# Patient Record
Sex: MALE | Race: WHITE | NOT HISPANIC OR LATINO | Employment: FULL TIME | URBAN - METROPOLITAN AREA
[De-identification: names, ages, dates, MRNs, and addresses within clinical notes are randomized per-mention and may not be internally consistent; named-entity substitution may affect disease eponyms.]

---

## 2017-01-10 ENCOUNTER — PATIENT MESSAGE (OUTPATIENT)
Dept: CARDIOLOGY | Facility: CLINIC | Age: 62
End: 2017-01-10

## 2017-01-12 ENCOUNTER — TELEPHONE (OUTPATIENT)
Dept: CARDIOLOGY | Facility: CLINIC | Age: 62
End: 2017-01-12

## 2017-01-12 NOTE — TELEPHONE ENCOUNTER
----- Message from Kalie Allison sent at 1/12/2017  9:51 AM CST -----  Contact: [phone]  Pt is calling to speak with the nurse regarding a form that was sent over twice by the pharmacy to be filled out for the pt rx of Crestor/please aidan to advise/pt has been out of this medication for a week /please call to advise/thanks..    [address] [address]  Phone: [phone] Fax: [phone]

## 2017-01-12 NOTE — TELEPHONE ENCOUNTER
Spoke with patient, advised PA was initiated for Crestor and determination would be made in 1-5 business days. Patient verbalized understanding.

## 2017-01-17 RX ORDER — ATORVASTATIN CALCIUM 80 MG/1
80 TABLET, FILM COATED ORAL DAILY
COMMUNITY
End: 2023-05-02 | Stop reason: SDUPTHER

## 2017-01-17 NOTE — TELEPHONE ENCOUNTER
PA was denied for Crestor. Notified Dr. Dueñas. He gave VORB for atorvastatin 80mg Q day. Called in script to patient's pharmacy.    Called and notified patient of medication change and doctor's recommendation. Patient verbalized understanding.     Patient med list reconciled.

## 2017-02-26 ENCOUNTER — PATIENT MESSAGE (OUTPATIENT)
Dept: FAMILY MEDICINE | Facility: CLINIC | Age: 62
End: 2017-02-26

## 2017-02-27 DIAGNOSIS — N40.0 BENIGN NON-NODULAR PROSTATIC HYPERPLASIA WITHOUT LOWER URINARY TRACT SYMPTOMS: ICD-10-CM

## 2017-02-27 RX ORDER — TAMSULOSIN HYDROCHLORIDE 0.4 MG/1
0.4 CAPSULE ORAL DAILY
Qty: 90 CAPSULE | Refills: 3 | Status: SHIPPED | OUTPATIENT
Start: 2017-02-27 | End: 2021-04-15

## 2017-05-15 RX ORDER — LISINOPRIL 20 MG/1
20 TABLET ORAL DAILY
Qty: 90 TABLET | Refills: 0 | Status: SHIPPED | OUTPATIENT
Start: 2017-05-15 | End: 2021-04-15

## 2017-05-15 RX ORDER — LISINOPRIL 20 MG/1
TABLET ORAL
Qty: 90 TABLET | Refills: 0 | Status: SHIPPED | OUTPATIENT
Start: 2017-05-15 | End: 2017-05-15 | Stop reason: SDUPTHER

## 2017-09-29 DIAGNOSIS — Z12.11 COLON CANCER SCREENING: ICD-10-CM

## 2019-04-25 ENCOUNTER — OFFICE VISIT (OUTPATIENT)
Dept: CARDIOLOGY | Facility: CLINIC | Age: 64
End: 2019-04-25
Payer: COMMERCIAL

## 2019-04-25 VITALS
SYSTOLIC BLOOD PRESSURE: 154 MMHG | BODY MASS INDEX: 24.24 KG/M2 | WEIGHT: 169.31 LBS | DIASTOLIC BLOOD PRESSURE: 80 MMHG | HEART RATE: 58 BPM | OXYGEN SATURATION: 94 % | HEIGHT: 70 IN

## 2019-04-25 DIAGNOSIS — E78.5 DYSLIPIDEMIA: Chronic | ICD-10-CM

## 2019-04-25 DIAGNOSIS — I10 ESSENTIAL HYPERTENSION: Chronic | ICD-10-CM

## 2019-04-25 DIAGNOSIS — Z79.1 NSAID LONG-TERM USE: ICD-10-CM

## 2019-04-25 DIAGNOSIS — I25.10 CORONARY ARTERY DISEASE INVOLVING NATIVE CORONARY ARTERY OF NATIVE HEART WITHOUT ANGINA PECTORIS: Primary | ICD-10-CM

## 2019-04-25 DIAGNOSIS — I10 HYPERTENSION, UNSPECIFIED TYPE: ICD-10-CM

## 2019-04-25 DIAGNOSIS — I10 HYPERTENSION, UNSPECIFIED TYPE: Primary | ICD-10-CM

## 2019-04-25 PROCEDURE — 99215 PR OFFICE/OUTPT VISIT, EST, LEVL V, 40-54 MIN: ICD-10-PCS | Mod: S$GLB,,, | Performed by: INTERNAL MEDICINE

## 2019-04-25 PROCEDURE — 3077F PR MOST RECENT SYSTOLIC BLOOD PRESSURE >= 140 MM HG: ICD-10-PCS | Mod: CPTII,S$GLB,, | Performed by: INTERNAL MEDICINE

## 2019-04-25 PROCEDURE — 93000 ELECTROCARDIOGRAM COMPLETE: CPT | Mod: S$GLB,,, | Performed by: INTERNAL MEDICINE

## 2019-04-25 PROCEDURE — 99999 PR PBB SHADOW E&M-EST. PATIENT-LVL III: ICD-10-PCS | Mod: PBBFAC,,, | Performed by: INTERNAL MEDICINE

## 2019-04-25 PROCEDURE — 3008F PR BODY MASS INDEX (BMI) DOCUMENTED: ICD-10-PCS | Mod: CPTII,S$GLB,, | Performed by: INTERNAL MEDICINE

## 2019-04-25 PROCEDURE — 99999 PR PBB SHADOW E&M-EST. PATIENT-LVL III: CPT | Mod: PBBFAC,,, | Performed by: INTERNAL MEDICINE

## 2019-04-25 PROCEDURE — 93000 EKG 12-LEAD: ICD-10-PCS | Mod: S$GLB,,, | Performed by: INTERNAL MEDICINE

## 2019-04-25 PROCEDURE — 99215 OFFICE O/P EST HI 40 MIN: CPT | Mod: S$GLB,,, | Performed by: INTERNAL MEDICINE

## 2019-04-25 PROCEDURE — 3079F DIAST BP 80-89 MM HG: CPT | Mod: CPTII,S$GLB,, | Performed by: INTERNAL MEDICINE

## 2019-04-25 PROCEDURE — 3077F SYST BP >= 140 MM HG: CPT | Mod: CPTII,S$GLB,, | Performed by: INTERNAL MEDICINE

## 2019-04-25 PROCEDURE — 3079F PR MOST RECENT DIASTOLIC BLOOD PRESSURE 80-89 MM HG: ICD-10-PCS | Mod: CPTII,S$GLB,, | Performed by: INTERNAL MEDICINE

## 2019-04-25 PROCEDURE — 3008F BODY MASS INDEX DOCD: CPT | Mod: CPTII,S$GLB,, | Performed by: INTERNAL MEDICINE

## 2019-04-25 RX ORDER — MELOXICAM 7.5 MG/1
7.5 TABLET ORAL
COMMUNITY
End: 2023-03-22

## 2019-04-25 RX ORDER — RAMIPRIL 5 MG/1
5 CAPSULE ORAL DAILY
COMMUNITY
End: 2021-04-15 | Stop reason: DRUGHIGH

## 2019-04-25 NOTE — PATIENT INSTRUCTIONS
Recommended Mediterranean dietEating Heart-Healthy Food: Using the DASH Plan  Eating for your heart doesnt have to be hard or boring. You just need to know how to make healthier choices. The DASH eating plan has been developed to help you do just that. DASH stands for Dietary Approaches to Stop Hypertension. It is a plan that has been proven to be healthier for your heart and to lower your risk for high blood pressure. It can also help lower your risk for cancer, heart disease, osteoporosis, and diabetes.  Choosing from Each Food Group  Choose foods from each of the food groups below each day. Try to get the recommended number of servings for each food group. The serving numbers are based on a diet of 2,000 calories a day. Talk to your doctor if youre unsure about your calorie needs.  Grains   Servings: 7-8 a day  A serving is:  · 1 slice bread  · 1 ounce dry cereal  · half a cup cooked rice or pasta  Best choices: Whole grains and any grains high in fiber.  Vegetables   Servings: 4-5 a day  A serving is:  · 1 cup raw leafy vegetable  · Half a cup cooked vegetable  · Three-quarter cup vegetable juice  Best choices: Fresh or frozen vegetable prepared without too much added salt or fat.    Fruits   Servings: 4-5 a day  A serving is:  · Three-quarter cup fruit juice  · 1 medium fruit  · One-quarter cup dried fruit  · One-half cup fresh, frozen, or canned fruit  Best choices: A variety of fresh fruits of different colors. Whole fruits are a much better choice than fruit juices.  Low-fat or Fat Free Dairy   Servings: 2-3 a day  A serving is:  · 8 ounces milk  · 1 cup yogurt  · One and a half ounces cheese  Best choices: Skim or 1% milk, low-fat or fat free yogurt or buttermilk, and low-fat cheeses.       Meat, Poultry, Fish   Servings: 2 or fewer a day  A serving is:  · 3 ounces cooked meat, poultry, or fish  Best choices: Lean meats and fish. Trim away visible fat. Broil, roast, or boil instead of frying. Remove skin  from poultry before eating.  Nuts, Seeds, Beans   Servings: 4-5 a week  A serving is:  · One third cup nuts (or one and a half ounces)  · 2 tablespoons sunflower seeds  · Half a cup cooked beans  Best choices: Dry roasted nuts with no salt added, lentils, kidney beans, garbanzo beans, and whole hoff beans.    Fats and Oils   Servings: 2 a day  A serving is:  · 1 teaspoon vegetable oil  · 1 teaspoon soft margarine  · 1 tablespoon low-fat mayonnaise  · 1 teaspoon regular mayonnaise  · 2 tablespoons light salad dressing  · 1 tablespoon regular salad dressing  Best choices: Monounsaturated and polyunsaturated fats such as olive, canola, or safflower oil.  Sweets   Servings: 5 a week or fewer  A serving is:  · 1 tablespoon sugar, maple syrup, or honey  · 1 tablespoon jam or jelly  · 1 half-ounce jelly beans (about 15)  · 8 ounces lemonade  Best choices: Dried fruit can be a satisfying sweet. Choose low-fat sweets when possible. And watch your serving sizes!       Aerobic Exercise for a Healthy Heart  Exercise is a lot more than an energy booster and a stress reliever. It also strengthens your heart muscle, lowers your blood pressure and blood cholesterol, and burns calories.      Remember, some activity is better than none.     Choose an Aerobic Activity  Choose a nonstop activity that makes your heart and lungs work harder than they do when you rest or walk normally. This aerobic exercise can improve the way your heart and other muscles use oxygen. Make it fun by exercising with a friend and choosing an activity you enjoy. Here are some ideas:  · Walking  · Swimming  · Bicycling  · Stair climbing  · Dancing  · Jogging  Exercise Regularly  If you havent been exercising regularly,  get your doctors okay first. Then start slowly.  Here are some tips:  · Begin exercising 3 times a week for 5-10 minutes at a time.  · When you feel comfortable, add a few minutes each week.  · Slowly build up to exercising 3-4 times each  week for 20-40 minutes. Aim for a total of 150 or more minutes a week.  · Be sure to carry your nitroglycerin with you when you exercise.  · If you get angina when youre exercising, stop what youre doing, take your nitroglycerin, and call your doctor.  © 1657-5286 [employer], [address] [place] [address], [place] [address]. All rights reserved. This information is not intended as a substitute for professional medical care. Always follow your healthcare professional's instructions.

## 2019-04-25 NOTE — PROGRESS NOTES
"Subjective:    Patient ID:  Basim Menendez is a 64 y.o. male who presents for evaluation of Annual Exam  For complex PCI of RCA,  to 2/3/2016, cardiac follow up at urging of girlfriend  PCP: Dr. Wang, [place], started in 2017  Eye: Maria Victoria Cruz  Prior Cardiologist: Dr. 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    Health literacy: medium  Activities: to gym or jog, 5 days weekly for an hour, no problem  Nicotine: quit , 20 py  Alcohol: socially  Illicit drugs: none  Cardiac symptoms: none  Home BP:  to 150, off lisinopril 20 mg for over a year and just started Ramipril 5 mg daily for the past month, have not seen much change  Medication compliance: yes  Diet: regular  Caffeine: 4 cpd  Labs: 2016, LDL 58, on Rx, recent lab result not available, told of good results  Last Echo: 2016  Last stress test:  in [place]  Cardiovascular angiogram: 2016 with PCI  ECG: NSR, rate 56  Fundoscopic exam: 2+ years ago      In 2016:  [facility] - "Attending Physician: Celestina Craig MD     Reason for Admission: CAD transfer for PCI     Procedures Performed: Procedure(s) (LRB):  HEART CATH-LEFT (Left)  [id number]-CORONARY (PTCA) with rotablator (Left)    Hospital Course HPI: Basim Menendez is a 60 y.o. male with ho HTN, dyslipidemia and fam hx of heart disease (father  of MI at 56, he also had type 1 DM). Pt transferred from [facility] for PCI if PDA which was unsuccessful at OSH, he also had successful YOVANY placement to the distal RCA (CD is on chart and was reviewed by interventional cardiology- Dr Boland). No other records are available except for cath CD. Pt says cath was elective and done because of an abnormal stress test. He has been having exertional chest pain for the past year. He describes tightness that sometimes only lasts seconds but can " "last several minutes, it sometimes resolves with continued exercise other times resolves with rest. He denies associated dyspnea, nausea, vomiting, diaphoresis or palpitations.    Course: Patient admitted to the Cardiology Team B Service and placed on 24 hour telemetry  //CAD- on admit continued Aggrastat, heparin and Brilinta. Coreg and Crestor were started. Patient remained chest pain free throughout hospital stay. 2D echo done and EF preserved: 55-60%. S/p PCI of RCA/PDA with rotablator and YOVANY. Will discharge today with follow up with Dr. Dueñas with [facility] in [place] at patients request and Cardiac Rehab. Patient educated on lifestyle modifications and importance of compliance with medications.    //HTN--Coreg started on admit and -160's. Will add low dose diuretic on discharge.  //DLD -takes crestor at home. Unsure of dose (pt does not have list or pill bottles with him). Would continue current dose as lipids are controlled  -total chol 123 and LDL 58"    ECHO 2/2016 CONCLUSIONS     1 - Normal left ventricular systolic function (EF 55-60%).     2 - Concentric remodeling. LV wall thickness is normal, with the septum measuring 1.2 cm and the posterior wall measuring 1.1 cm across.    3 - Normal left ventricular diastolic function.     4 - Normal right ventricular systolic function .     5 - Trivial mitral regurgitation.     6 - Intermediate central venous pressure.     PCI, 2/2/2016  HEMODYNAMIC RESULTS:    BP: 102/60    C. ANGIOGRAPHIC RESULTS:    DIAGNOSTIC:       Patient has a right dominant coronary artery.        - Right Coronary Artery:             The RCA is diffusely diseased. There is XOCHILT 3 flow.               The proximal RCA has a 75% stenosis. There is XOCHILT 3 flow.       - Posterior Descending Artery:             The proximal PDA has a 90% stenosis. There is XOCHILT 3 flow.               The proximal PDA has a 90% stenosis. There is XOCHILT 3 flow.               The distal PDA has luminal " irregularities. There is XOCHILT 3 flow.       - Right Ventricle:             The right ventricle was not studied.       - Common Femoral Artery:             The left CFA has plaque. There is XOCHILT 3 flow.       - Femoral Artery:             The left femoral artery has plaque. There is XOCHILT 3 flow.    INTERVENTION:         RCA:              The intervention of the lesion was unsuccessful, secondary to . The vessel is diffusely diseased. The vessel was accessed natively.  The following items were used: [place] And Advancer Combo 1.50.       Proximal RCA:              The lesion was successfully intervened. Post-stenosis of 0%, post-XOCHILT 3 flow and TMP grade 3. The vessel was accessed natively.  The following items were used: Sprinter Balln Otw 2.0 X 10, 2.5X15 Voyager Rx Balloon, Stent Resolute Rx 3.0x38   (YOVANY) and Stent Resolute Rx 3.00x15 (YOVANY).       Proximal PDA:              The lesion was successfully intervened. Post-stenosis of 40%, post-XOCHILT 3 flow and TMP grade 3. The vessel was accessed natively.  The following items were used: Melba And Advancer Combo 1.50.       Proximal PDA:              The lesion was successfully intervened. Post-stenosis of 0%, post-XOCHILT 3 flow and TMP grade 3. The vessel was accessed natively.  The following items were used: 2.5X15 Voyager Rx Balloon, Stent Resolute Rx 2.75x18 (YOVANY) and 2.5X08 Voyager NC   Balloon.       Distal PDA:              The following items were used: Sprinter Balln Otw 2.0 X 10 and Rotablator Wire.       Right Ventricle:              The following items were used: 5FR Heart Pacer Cath.    D. SUMMARY/POST-OPERATIVE DIAGNOSIS:    1. Successful PCI.  2. Rotoblstor and stenting of RCA in multiple segments.    E. RECOMMENDATIONS:    1. Routine post-cath care.  2. Continue medical management.  3. Risk factor reductions.  4. ASA 81mg.  5. Ticagrelor (Brilinta) for one year.  6. Follow up with Dr. Fabiano Zabala.    Since DC no further sharp CP, able to bike for 16 miles  this past weekend without problem.    Since visit of 2/18/2016, have seasonal allergies, on Z-Pack, which is helpful. Some intermittent very brief and minor CP. Home BP log reviewed 111-177/61-73, mostly under SBP of 130. Compliant with medications. At the gym daily for an hour. Have done 2 sessions of Yoga, enjoy it. To start Cardiac Rehab 4/1.    In 7/2016, completed 2 weeks auto trip to [place], jogged for 2 miles over 30 minutes, also walked 2 9-hole golf course without difficulties, still very occasional brief chest discomfort, no inciting events. Home BP log 118-137/67-77. No problem with medications. All reviewed, timeline discussed. Enjoy playing ice hockey, season start late October, advised to complete at least a year DAPT.    HPI comments: in 4/2019, here for cardiac review, had change in insurance and no follow up since 7/2016. No heart worries. Had recent change in BP regiment to Ramipril 5 mg daily. Discussed HTN Rx, current target < 130/80.    Review of Systems   Constitution: Negative for diaphoresis, fever, weakness, malaise/fatigue, night sweats and weight gain.   HENT: Negative for headaches, nosebleeds and tinnitus.    Eyes: no further redness. Negative for visual disturbance.   Cardiovascular: no further chest pain. Negative for claudication, cyanosis, dyspnea on exertion, irregular heartbeat, leg swelling, near-syncope, orthopnea, palpitations and paroxysmal nocturnal dyspnea.   Respiratory: Positive for cough (likely seasonal allergies), no shortness of breath, sleep disturbances due to breathing, snoring and wheezing. [place] score 3   Endocrine: Negative for polydipsia and polyuria.   Hematologic/Lymphatic: Does not bruise/bleed easily.   Skin: Negative for nail changes, color change, flushing, poor wound healing and suspicious lesions.   Musculoskeletal: Positive for arthritis. Negative for falls, gout, joint pain, joint swelling, muscle cramps, muscle weakness and myalgias.  "  Gastrointestinal: Negative for heartburn, hematemesis, hematochezia, melena and nausea.   Neurological: Negative for disturbances in coordination, excessive daytime sleepiness, dizziness, focal weakness, light-headedness, loss of balance, numbness and vertigo.   Psychiatric/Behavioral: Negative for depression and substance abuse. The patient does not have insomnia and is not nervous/anxious.         Objective:    Physical Exam   Constitutional: He is oriented to person, place, and time. He appears well-developed and well-nourished.   HENT:   Head: Normocephalic.   Eyes: Conjunctivae and EOM are normal. Pupils are equal, round, and reactive to light.   Neck: Normal range of motion. Neck supple. No JVD present. No thyromegaly present.   Circumference 15.5"   Cardiovascular: Normal rate, regular rhythm, normal heart sounds and intact distal pulses.  Exam reveals no gallop and no friction rub.    No murmur heard.  Pulmonary/Chest: Effort normal and breath sounds normal. He has no rales. He exhibits no tenderness.   Abdominal: Soft. Bowel sounds are normal. There is no tenderness.   Waist 32.5" increased to 34.5", hip 39.75"   Musculoskeletal: Normal range of motion. He exhibits no edema.   Lymphadenopathy:     He has no cervical adenopathy.   Neurological: He is alert and oriented to person, place, and time.   Skin: Skin is warm and dry. No rash noted.         Assessment:       1. Coronary artery disease involving native coronary artery of native heart without angina pectoris    2. Hypertension, unspecified type    3. Dyslipidemia, baseline     4. Essential hypertension    5. NSAID long-term use         Plan:       Basim was seen today for follow-up.    Diagnoses and all orders for this visit:    Coronary artery disease involving native coronary artery of native heart without angina pectoris  -     Echocardiogram stress test with CFD (Cupid Only); Future    Hypertension, unspecified type  -     EKG 12-lead  -   "   Echocardiogram stress test with CFD (Cupid Only); Future    Dyslipidemia, baseline     Essential hypertension    NSAID long-term use      Body mass index (BMI) 23.0-23.9, adult, today 24.2    - All medical issues reviewed, continue current Rx.  - CV status stable, continue current Rx, all medications reviewed, patient acknowledge good understanding.  - Recommend healthy living: moderate alcohol, healthy diet and regular exercise aiming for fitness, and weight control   - Instruction for Mediterranean diet and heart healthy exercise given.  - Check home blood pressure, 2 days weekly, do 2 readings within 5 minutes in AM and PM, keep log for review.  - Highly recommend 30 minutes of exercise / activities daily, can have Sunday off, with 2-3 sessions of muscle strengthening weekly. A  would be very helpful.  - Recommend at least annual cardiovascular evaluation in view of patient's significant risk factors.  - Phone review / encourage use of [employer]    Patient Active Problem List   Diagnosis    CAD (coronary artery disease), 3 YOVANY 2/2/2016    Essential hypertension    Dyslipidemia, baseline     Chronic back pain    Anemia    LALI (generalized anxiety disorder)    Benign non-nodular prostatic hyperplasia with lower urinary tract symptoms    NSAID long-term use     Total face-to-face time with the patient was 35 minutes and greater than 50% was spent in counseling and coordination of care. The above assessment and plan have been discussed at length. Labs and procedure over the last 6 months reviewed. Problem List updated. Asked to bring in all active medications / pills bottles with next visit.

## 2019-07-17 ENCOUNTER — TELEPHONE (OUTPATIENT)
Dept: FAMILY MEDICINE | Facility: CLINIC | Age: 64
End: 2019-07-17

## 2021-01-31 LAB — NONINV COLON CA DNA+OCC BLD SCRN STL QL: NEGATIVE

## 2021-04-15 ENCOUNTER — OFFICE VISIT (OUTPATIENT)
Dept: CARDIOLOGY | Facility: CLINIC | Age: 66
End: 2021-04-15
Payer: COMMERCIAL

## 2021-04-15 VITALS
BODY MASS INDEX: 24.57 KG/M2 | WEIGHT: 171.63 LBS | RESPIRATION RATE: 18 BRPM | OXYGEN SATURATION: 98 % | SYSTOLIC BLOOD PRESSURE: 115 MMHG | HEIGHT: 70 IN | HEART RATE: 62 BPM | DIASTOLIC BLOOD PRESSURE: 75 MMHG

## 2021-04-15 DIAGNOSIS — I25.118 CORONARY ARTERY DISEASE OF NATIVE ARTERY OF NATIVE HEART WITH STABLE ANGINA PECTORIS: ICD-10-CM

## 2021-04-15 DIAGNOSIS — R97.20 ELEVATED PSA, BETWEEN 10 AND LESS THAN 20 NG/ML: ICD-10-CM

## 2021-04-15 DIAGNOSIS — I20.9 ANGINA PECTORIS: Primary | ICD-10-CM

## 2021-04-15 DIAGNOSIS — D64.9 ANEMIA, UNSPECIFIED TYPE: ICD-10-CM

## 2021-04-15 DIAGNOSIS — E78.5 DYSLIPIDEMIA: Chronic | ICD-10-CM

## 2021-04-15 DIAGNOSIS — I10 ESSENTIAL HYPERTENSION: Chronic | ICD-10-CM

## 2021-04-15 PROBLEM — Z79.1 NSAID LONG-TERM USE: Status: RESOLVED | Noted: 2019-04-25 | Resolved: 2021-04-15

## 2021-04-15 PROCEDURE — 1125F PR PAIN SEVERITY QUANTIFIED, PAIN PRESENT: ICD-10-PCS | Mod: S$GLB,,, | Performed by: INTERNAL MEDICINE

## 2021-04-15 PROCEDURE — 3288F PR FALLS RISK ASSESSMENT DOCUMENTED: ICD-10-PCS | Mod: S$GLB,,, | Performed by: INTERNAL MEDICINE

## 2021-04-15 PROCEDURE — 99215 OFFICE O/P EST HI 40 MIN: CPT | Mod: 25,S$GLB,, | Performed by: INTERNAL MEDICINE

## 2021-04-15 PROCEDURE — 3288F FALL RISK ASSESSMENT DOCD: CPT | Mod: S$GLB,,, | Performed by: INTERNAL MEDICINE

## 2021-04-15 PROCEDURE — 3008F PR BODY MASS INDEX (BMI) DOCUMENTED: ICD-10-PCS | Mod: S$GLB,,, | Performed by: INTERNAL MEDICINE

## 2021-04-15 PROCEDURE — 99215 PR OFFICE/OUTPT VISIT, EST, LEVL V, 40-54 MIN: ICD-10-PCS | Mod: 25,S$GLB,, | Performed by: INTERNAL MEDICINE

## 2021-04-15 PROCEDURE — 1125F AMNT PAIN NOTED PAIN PRSNT: CPT | Mod: S$GLB,,, | Performed by: INTERNAL MEDICINE

## 2021-04-15 PROCEDURE — 93000 ELECTROCARDIOGRAM COMPLETE: CPT | Mod: S$GLB,,, | Performed by: INTERNAL MEDICINE

## 2021-04-15 PROCEDURE — 99999 PR PBB SHADOW E&M-EST. PATIENT-LVL IV: CPT | Mod: PBBFAC,,, | Performed by: INTERNAL MEDICINE

## 2021-04-15 PROCEDURE — 1101F PR PT FALLS ASSESS DOC 0-1 FALLS W/OUT INJ PAST YR: ICD-10-PCS | Mod: S$GLB,,, | Performed by: INTERNAL MEDICINE

## 2021-04-15 PROCEDURE — 1101F PT FALLS ASSESS-DOCD LE1/YR: CPT | Mod: S$GLB,,, | Performed by: INTERNAL MEDICINE

## 2021-04-15 PROCEDURE — 3008F BODY MASS INDEX DOCD: CPT | Mod: S$GLB,,, | Performed by: INTERNAL MEDICINE

## 2021-04-15 PROCEDURE — 93000 EKG 12-LEAD: ICD-10-PCS | Mod: S$GLB,,, | Performed by: INTERNAL MEDICINE

## 2021-04-15 PROCEDURE — 99999 PR PBB SHADOW E&M-EST. PATIENT-LVL IV: ICD-10-PCS | Mod: PBBFAC,,, | Performed by: INTERNAL MEDICINE

## 2021-04-15 RX ORDER — RAMIPRIL 10 MG/1
10 CAPSULE ORAL DAILY
COMMUNITY
End: 2023-05-02 | Stop reason: SDUPTHER

## 2021-04-19 ENCOUNTER — PATIENT MESSAGE (OUTPATIENT)
Dept: CARDIOLOGY | Facility: CLINIC | Age: 66
End: 2021-04-19

## 2021-04-19 DIAGNOSIS — I20.9 ANGINA PECTORIS: Primary | ICD-10-CM

## 2021-04-19 DIAGNOSIS — D64.9 ANEMIA, UNSPECIFIED TYPE: ICD-10-CM

## 2021-10-07 ENCOUNTER — PATIENT MESSAGE (OUTPATIENT)
Dept: CARDIOLOGY | Facility: CLINIC | Age: 66
End: 2021-10-07

## 2021-12-01 ENCOUNTER — PATIENT MESSAGE (OUTPATIENT)
Dept: CARDIOLOGY | Facility: CLINIC | Age: 66
End: 2021-12-01
Payer: COMMERCIAL

## 2021-12-01 ENCOUNTER — HOSPITAL ENCOUNTER (OUTPATIENT)
Dept: CARDIOLOGY | Facility: HOSPITAL | Age: 66
Discharge: HOME | End: 2021-12-01
Attending: INTERNAL MEDICINE
Payer: COMMERCIAL

## 2021-12-01 VITALS
WEIGHT: 171 LBS | HEIGHT: 70 IN | HEART RATE: 81 BPM | DIASTOLIC BLOOD PRESSURE: 76 MMHG | BODY MASS INDEX: 24.48 KG/M2 | SYSTOLIC BLOOD PRESSURE: 184 MMHG

## 2021-12-01 DIAGNOSIS — I20.9 ANGINA PECTORIS: ICD-10-CM

## 2021-12-01 DIAGNOSIS — I25.118 CORONARY ARTERY DISEASE OF NATIVE ARTERY OF NATIVE HEART WITH STABLE ANGINA PECTORIS: ICD-10-CM

## 2021-12-01 DIAGNOSIS — I10 ESSENTIAL HYPERTENSION: ICD-10-CM

## 2021-12-01 LAB
AORTIC ROOT ANNULUS: 2.8 CM
AORTIC VALVE CUSP SEPERATION: 1.59 CM
BSA FOR ECHO PROCEDURE: 1.96 M2
CV ECHO LV RWT: 0.68 CM
CV STRESS BASE HR: 79 BPM
DIASTOLIC BLOOD PRESSURE: 83 MMHG
DOP CALC LVOT AREA: 3.6 CM2
DOP CALC LVOT DIAMETER: 2.14 CM
ECHO LV POSTERIOR WALL: 1.17 CM (ref 0.6–1.1)
EJECTION FRACTION: 58 %
FRACTIONAL SHORTENING: 30 % (ref 28–44)
INTERVENTRICULAR SEPTUM: 1.16 CM (ref 0.6–1.1)
LA MAJOR: 4.11 CM
LA MINOR: 3.71 CM
LEFT ATRIUM SIZE: 3.86 CM
LEFT ATRIUM VOLUME INDEX MOD: 3.8 ML/M2
LEFT ATRIUM VOLUME MOD: 7.45 CM3
LEFT INTERNAL DIMENSION IN SYSTOLE: 2.41 CM (ref 2.1–4)
LEFT VENTRICLE DIASTOLIC VOLUME INDEX: 25 ML/M2
LEFT VENTRICLE DIASTOLIC VOLUME: 48.75 ML
LEFT VENTRICLE MASS INDEX: 65 G/M2
LEFT VENTRICLE SYSTOLIC VOLUME INDEX: 10.4 ML/M2
LEFT VENTRICLE SYSTOLIC VOLUME: 20.28 ML
LEFT VENTRICULAR INTERNAL DIMENSION IN DIASTOLE: 3.44 CM (ref 3.5–6)
LEFT VENTRICULAR MASS: 126.56 G
OHS CV CPX 1 MINUTE RECOVERY HEART RATE: 123 BPM
OHS CV CPX 85 PERCENT MAX PREDICTED HEART RATE MALE: 132
OHS CV CPX ESTIMATED METS: 17
OHS CV CPX MAX PREDICTED HEART RATE: 155
OHS CV CPX PATIENT IS FEMALE: 0
OHS CV CPX PATIENT IS MALE: 1
OHS CV CPX PEAK DIASTOLIC BLOOD PRESSURE: 78 MMHG
OHS CV CPX PEAK HEAR RATE: 151 BPM
OHS CV CPX PEAK RATE PRESSURE PRODUCT: ABNORMAL
OHS CV CPX PEAK SYSTOLIC BLOOD PRESSURE: 211 MMHG
OHS CV CPX PERCENT MAX PREDICTED HEART RATE ACHIEVED: 97
OHS CV CPX RATE PRESSURE PRODUCT PRESENTING: ABNORMAL
RA MAJOR: 4.69 CM
RA WIDTH: 3.7 CM
RIGHT VENTRICULAR END-DIASTOLIC DIMENSION: 2.83 CM
STRESS ECHO POST EXERCISE DUR MIN: 10 MINUTES
STRESS ECHO POST EXERCISE DUR SEC: 1 SECONDS
STRESS ST DEPRESSION: 1.5 MM
SYSTOLIC BLOOD PRESSURE: 134 MMHG

## 2021-12-01 PROCEDURE — 93351 STRESS TTE COMPLETE: CPT | Mod: 26,,, | Performed by: INTERNAL MEDICINE

## 2021-12-01 PROCEDURE — 93351 STRESS ECHO (CUPID ONLY): ICD-10-PCS | Mod: 26,,, | Performed by: INTERNAL MEDICINE

## 2021-12-01 PROCEDURE — 93351 STRESS TTE COMPLETE: CPT

## 2021-12-31 ENCOUNTER — PATIENT MESSAGE (OUTPATIENT)
Dept: CARDIOLOGY | Facility: CLINIC | Age: 66
End: 2021-12-31
Payer: COMMERCIAL

## 2022-03-21 ENCOUNTER — PATIENT MESSAGE (OUTPATIENT)
Dept: CARDIOLOGY | Facility: CLINIC | Age: 67
End: 2022-03-21
Payer: COMMERCIAL

## 2023-02-08 DIAGNOSIS — I25.10 CORONARY ARTERY DISEASE INVOLVING NATIVE CORONARY ARTERY OF NATIVE HEART WITHOUT ANGINA PECTORIS: ICD-10-CM

## 2023-02-08 RX ORDER — CLOPIDOGREL BISULFATE 75 MG/1
75 TABLET ORAL DAILY
Qty: 90 TABLET | Refills: 3 | Status: SHIPPED | OUTPATIENT
Start: 2023-02-08 | End: 2023-05-02 | Stop reason: SDUPTHER

## 2023-02-28 ENCOUNTER — OFFICE VISIT (OUTPATIENT)
Dept: PRIMARY CARE CLINIC | Facility: CLINIC | Age: 68
End: 2023-02-28
Payer: COMMERCIAL

## 2023-02-28 ENCOUNTER — TELEPHONE (OUTPATIENT)
Dept: PRIMARY CARE CLINIC | Facility: CLINIC | Age: 68
End: 2023-02-28

## 2023-02-28 VITALS
DIASTOLIC BLOOD PRESSURE: 82 MMHG | HEART RATE: 62 BPM | TEMPERATURE: 98 F | SYSTOLIC BLOOD PRESSURE: 144 MMHG | OXYGEN SATURATION: 97 % | WEIGHT: 169 LBS | BODY MASS INDEX: 24.2 KG/M2 | HEIGHT: 70 IN

## 2023-02-28 DIAGNOSIS — E78.5 HYPERLIPIDEMIA, UNSPECIFIED HYPERLIPIDEMIA TYPE: ICD-10-CM

## 2023-02-28 DIAGNOSIS — I10 ESSENTIAL HYPERTENSION, BENIGN: ICD-10-CM

## 2023-02-28 DIAGNOSIS — Z11.3 SCREEN FOR STD (SEXUALLY TRANSMITTED DISEASE): Primary | ICD-10-CM

## 2023-02-28 DIAGNOSIS — R73.9 ELEVATED BLOOD SUGAR: ICD-10-CM

## 2023-02-28 PROCEDURE — 3288F FALL RISK ASSESSMENT DOCD: CPT | Mod: CPTII,S$GLB,, | Performed by: INTERNAL MEDICINE

## 2023-02-28 PROCEDURE — 3077F PR MOST RECENT SYSTOLIC BLOOD PRESSURE >= 140 MM HG: ICD-10-PCS | Mod: CPTII,S$GLB,, | Performed by: INTERNAL MEDICINE

## 2023-02-28 PROCEDURE — 3079F DIAST BP 80-89 MM HG: CPT | Mod: CPTII,S$GLB,, | Performed by: INTERNAL MEDICINE

## 2023-02-28 PROCEDURE — 3079F PR MOST RECENT DIASTOLIC BLOOD PRESSURE 80-89 MM HG: ICD-10-PCS | Mod: CPTII,S$GLB,, | Performed by: INTERNAL MEDICINE

## 2023-02-28 PROCEDURE — 99213 PR OFFICE/OUTPT VISIT, EST, LEVL III, 20-29 MIN: ICD-10-PCS | Mod: S$GLB,,, | Performed by: INTERNAL MEDICINE

## 2023-02-28 PROCEDURE — 1101F PT FALLS ASSESS-DOCD LE1/YR: CPT | Mod: CPTII,S$GLB,, | Performed by: INTERNAL MEDICINE

## 2023-02-28 PROCEDURE — 1160F PR REVIEW ALL MEDS BY PRESCRIBER/CLIN PHARMACIST DOCUMENTED: ICD-10-PCS | Mod: CPTII,S$GLB,, | Performed by: INTERNAL MEDICINE

## 2023-02-28 PROCEDURE — 1160F RVW MEDS BY RX/DR IN RCRD: CPT | Mod: CPTII,S$GLB,, | Performed by: INTERNAL MEDICINE

## 2023-02-28 PROCEDURE — 3008F BODY MASS INDEX DOCD: CPT | Mod: CPTII,S$GLB,, | Performed by: INTERNAL MEDICINE

## 2023-02-28 PROCEDURE — 1159F PR MEDICATION LIST DOCUMENTED IN MEDICAL RECORD: ICD-10-PCS | Mod: CPTII,S$GLB,, | Performed by: INTERNAL MEDICINE

## 2023-02-28 PROCEDURE — 3008F PR BODY MASS INDEX (BMI) DOCUMENTED: ICD-10-PCS | Mod: CPTII,S$GLB,, | Performed by: INTERNAL MEDICINE

## 2023-02-28 PROCEDURE — 1159F MED LIST DOCD IN RCRD: CPT | Mod: CPTII,S$GLB,, | Performed by: INTERNAL MEDICINE

## 2023-02-28 PROCEDURE — 1101F PR PT FALLS ASSESS DOC 0-1 FALLS W/OUT INJ PAST YR: ICD-10-PCS | Mod: CPTII,S$GLB,, | Performed by: INTERNAL MEDICINE

## 2023-02-28 PROCEDURE — 4010F ACE/ARB THERAPY RXD/TAKEN: CPT | Mod: CPTII,S$GLB,, | Performed by: INTERNAL MEDICINE

## 2023-02-28 PROCEDURE — 99213 OFFICE O/P EST LOW 20 MIN: CPT | Mod: S$GLB,,, | Performed by: INTERNAL MEDICINE

## 2023-02-28 PROCEDURE — 3288F PR FALLS RISK ASSESSMENT DOCUMENTED: ICD-10-PCS | Mod: CPTII,S$GLB,, | Performed by: INTERNAL MEDICINE

## 2023-02-28 PROCEDURE — 4010F PR ACE/ARB THEARPY RXD/TAKEN: ICD-10-PCS | Mod: CPTII,S$GLB,, | Performed by: INTERNAL MEDICINE

## 2023-02-28 PROCEDURE — 3077F SYST BP >= 140 MM HG: CPT | Mod: CPTII,S$GLB,, | Performed by: INTERNAL MEDICINE

## 2023-02-28 NOTE — TELEPHONE ENCOUNTER
----- Message from Mario Lockett sent at 2/27/2023  6:39 PM CST -----  Regarding: Patient advice  Contact: Pt  Pt called in regards to getting orders for std screening       Pt can be reached at [phone]

## 2023-02-28 NOTE — PROGRESS NOTES
Subjective:       Patient ID: Basim Menendez is a 68 y.o. male.    Chief Complaint: STD TESTING      Patient is established already .......... presents today for a f/u visit , to discuss labs  and to get screened for STDs        Exposure to STD  Primary symptoms comment: n/a. This is a new problem. The current episode started more than 1 month ago. The problem has been unchanged. Pertinent negatives include no fever.   Review of Systems   Constitutional:  Negative for activity change, appetite change and fever.   Respiratory: Negative.     Cardiovascular: Negative.    Gastrointestinal: Negative.    Genitourinary: Negative.    Musculoskeletal: Negative.        Objective:      Physical Exam  Vitals and nursing note reviewed.   Constitutional:       Appearance: Normal appearance.   Cardiovascular:      Rate and Rhythm: Normal rate and regular rhythm.      Pulses: Normal pulses.      Heart sounds: Normal heart sounds. No murmur heard.    No friction rub. No gallop.   Pulmonary:      Effort: Pulmonary effort is normal.      Breath sounds: Normal breath sounds. No wheezing.   Abdominal:      General: Abdomen is flat. Bowel sounds are normal.      Palpations: Abdomen is soft.   Musculoskeletal:         General: Normal range of motion.   Skin:     General: Skin is warm and dry.   Neurological:      General: No focal deficit present.      Mental Status: He is alert and oriented to person, place, and time.   Psychiatric:         Mood and Affect: Mood normal.       Assessment:       1. Screen for STD (sexually transmitted disease)  Overview:  Basim has been dating a new girlfriend and both of them are concerned about getting screened for STDs but they denied any symptoms    Assessment & Plan:  Screen for STDs today    Orders:  -     HIV 1/2 Ag/Ab (4th Gen); Future; Expected date: 08/28/2023  -     Hepatitis B surface antibody; Future; Expected date: 02/28/2023  -     RPR; Future; Expected date: 02/28/2023  -     C.  trachomatis/N. gonorrhoeae by AMP DNA [facility]; Urine; Future; Expected date: 02/28/2023    2. Essential hypertension, benign  -     Comprehensive Metabolic Panel; Future; Expected date: 02/28/2023  -     Microalbumin/Creatinine Ratio, Urine; Future; Expected date: 02/28/2023    3. Hyperlipidemia, unspecified hyperlipidemia type  -     Lipid Panel; Future; Expected date: 02/28/2023    4. Elevated blood sugar  -     Hemoglobin A1C; Future; Expected date: 02/28/2023             Plan:       1. Screen for STD (sexually transmitted disease)  Overview:  Basim has been dating a new girlfriend and both of them are concerned about getting screened for STDs but they denied any symptoms    Assessment & Plan:  Screen for STDs today    Orders:  -     HIV 1/2 Ag/Ab (4th Gen); Future; Expected date: 08/28/2023  -     Hepatitis B surface antibody; Future; Expected date: 02/28/2023  -     RPR; Future; Expected date: 02/28/2023  -     C. trachomatis/N. gonorrhoeae by AMP DNA [facility]; Urine; Future; Expected date: 02/28/2023    2. Essential hypertension, benign  -     Comprehensive Metabolic Panel; Future; Expected date: 02/28/2023  -     Microalbumin/Creatinine Ratio, Urine; Future; Expected date: 02/28/2023    3. Hyperlipidemia, unspecified hyperlipidemia type  -     Lipid Panel; Future; Expected date: 02/28/2023    4. Elevated blood sugar  -     Hemoglobin A1C; Future; Expected date: 02/28/2023

## 2023-03-22 ENCOUNTER — OFFICE VISIT (OUTPATIENT)
Dept: FAMILY MEDICINE | Facility: CLINIC | Age: 68
End: 2023-03-22
Payer: COMMERCIAL

## 2023-03-22 VITALS
OXYGEN SATURATION: 96 % | HEIGHT: 70 IN | BODY MASS INDEX: 23.93 KG/M2 | HEART RATE: 69 BPM | RESPIRATION RATE: 20 BRPM | TEMPERATURE: 98 F | WEIGHT: 167.13 LBS | DIASTOLIC BLOOD PRESSURE: 82 MMHG | SYSTOLIC BLOOD PRESSURE: 128 MMHG

## 2023-03-22 DIAGNOSIS — M24.849 THUMB JOINT LOCKING: ICD-10-CM

## 2023-03-22 DIAGNOSIS — Z23 NEED FOR PROPHYLACTIC VACCINATION WITH COMBINED DIPHTHERIA-TETANUS-PERTUSSIS (DTP) VACCINE: ICD-10-CM

## 2023-03-22 DIAGNOSIS — Z00.00 WELLNESS EXAMINATION: ICD-10-CM

## 2023-03-22 DIAGNOSIS — Z13.6 ENCOUNTER FOR ABDOMINAL AORTIC ANEURYSM (AAA) SCREENING: Primary | ICD-10-CM

## 2023-03-22 PROCEDURE — 1160F RVW MEDS BY RX/DR IN RCRD: CPT | Mod: CPTII,S$GLB,, | Performed by: INTERNAL MEDICINE

## 2023-03-22 PROCEDURE — 99999 PR PBB SHADOW E&M-EST. PATIENT-LVL III: ICD-10-PCS | Mod: PBBFAC,,, | Performed by: INTERNAL MEDICINE

## 2023-03-22 PROCEDURE — 1126F AMNT PAIN NOTED NONE PRSNT: CPT | Mod: CPTII,S$GLB,, | Performed by: INTERNAL MEDICINE

## 2023-03-22 PROCEDURE — 3008F BODY MASS INDEX DOCD: CPT | Mod: CPTII,S$GLB,, | Performed by: INTERNAL MEDICINE

## 2023-03-22 PROCEDURE — 90471 IMMUNIZATION ADMIN: CPT | Mod: S$GLB,,, | Performed by: INTERNAL MEDICINE

## 2023-03-22 PROCEDURE — 3079F PR MOST RECENT DIASTOLIC BLOOD PRESSURE 80-89 MM HG: ICD-10-PCS | Mod: CPTII,S$GLB,, | Performed by: INTERNAL MEDICINE

## 2023-03-22 PROCEDURE — 99397 PER PM REEVAL EST PAT 65+ YR: CPT | Mod: 25,S$GLB,, | Performed by: INTERNAL MEDICINE

## 2023-03-22 PROCEDURE — 1101F PT FALLS ASSESS-DOCD LE1/YR: CPT | Mod: CPTII,S$GLB,, | Performed by: INTERNAL MEDICINE

## 2023-03-22 PROCEDURE — 3288F PR FALLS RISK ASSESSMENT DOCUMENTED: ICD-10-PCS | Mod: CPTII,S$GLB,, | Performed by: INTERNAL MEDICINE

## 2023-03-22 PROCEDURE — 99397 PR PREVENTIVE VISIT,EST,65 & OVER: ICD-10-PCS | Mod: 25,S$GLB,, | Performed by: INTERNAL MEDICINE

## 2023-03-22 PROCEDURE — 99212 PR OFFICE/OUTPT VISIT, EST, LEVL II, 10-19 MIN: ICD-10-PCS | Mod: 25,S$GLB,, | Performed by: INTERNAL MEDICINE

## 2023-03-22 PROCEDURE — 3008F PR BODY MASS INDEX (BMI) DOCUMENTED: ICD-10-PCS | Mod: CPTII,S$GLB,, | Performed by: INTERNAL MEDICINE

## 2023-03-22 PROCEDURE — 3079F DIAST BP 80-89 MM HG: CPT | Mod: CPTII,S$GLB,, | Performed by: INTERNAL MEDICINE

## 2023-03-22 PROCEDURE — 1101F PR PT FALLS ASSESS DOC 0-1 FALLS W/OUT INJ PAST YR: ICD-10-PCS | Mod: CPTII,S$GLB,, | Performed by: INTERNAL MEDICINE

## 2023-03-22 PROCEDURE — 1159F MED LIST DOCD IN RCRD: CPT | Mod: CPTII,S$GLB,, | Performed by: INTERNAL MEDICINE

## 2023-03-22 PROCEDURE — 1159F PR MEDICATION LIST DOCUMENTED IN MEDICAL RECORD: ICD-10-PCS | Mod: CPTII,S$GLB,, | Performed by: INTERNAL MEDICINE

## 2023-03-22 PROCEDURE — 90715 TDAP VACCINE GREATER THAN OR EQUAL TO 7YO IM: ICD-10-PCS | Mod: S$GLB,,, | Performed by: INTERNAL MEDICINE

## 2023-03-22 PROCEDURE — 3074F SYST BP LT 130 MM HG: CPT | Mod: CPTII,S$GLB,, | Performed by: INTERNAL MEDICINE

## 2023-03-22 PROCEDURE — 3074F PR MOST RECENT SYSTOLIC BLOOD PRESSURE < 130 MM HG: ICD-10-PCS | Mod: CPTII,S$GLB,, | Performed by: INTERNAL MEDICINE

## 2023-03-22 PROCEDURE — 3288F FALL RISK ASSESSMENT DOCD: CPT | Mod: CPTII,S$GLB,, | Performed by: INTERNAL MEDICINE

## 2023-03-22 PROCEDURE — 4010F ACE/ARB THERAPY RXD/TAKEN: CPT | Mod: CPTII,S$GLB,, | Performed by: INTERNAL MEDICINE

## 2023-03-22 PROCEDURE — 99212 OFFICE O/P EST SF 10 MIN: CPT | Mod: 25,S$GLB,, | Performed by: INTERNAL MEDICINE

## 2023-03-22 PROCEDURE — 1126F PR PAIN SEVERITY QUANTIFIED, NO PAIN PRESENT: ICD-10-PCS | Mod: CPTII,S$GLB,, | Performed by: INTERNAL MEDICINE

## 2023-03-22 PROCEDURE — 90471 TDAP VACCINE GREATER THAN OR EQUAL TO 7YO IM: ICD-10-PCS | Mod: S$GLB,,, | Performed by: INTERNAL MEDICINE

## 2023-03-22 PROCEDURE — 90715 TDAP VACCINE 7 YRS/> IM: CPT | Mod: S$GLB,,, | Performed by: INTERNAL MEDICINE

## 2023-03-22 PROCEDURE — 99999 PR PBB SHADOW E&M-EST. PATIENT-LVL III: CPT | Mod: PBBFAC,,, | Performed by: INTERNAL MEDICINE

## 2023-03-22 PROCEDURE — 4010F PR ACE/ARB THEARPY RXD/TAKEN: ICD-10-PCS | Mod: CPTII,S$GLB,, | Performed by: INTERNAL MEDICINE

## 2023-03-22 PROCEDURE — 1160F PR REVIEW ALL MEDS BY PRESCRIBER/CLIN PHARMACIST DOCUMENTED: ICD-10-PCS | Mod: CPTII,S$GLB,, | Performed by: INTERNAL MEDICINE

## 2023-03-22 RX ORDER — MUPIROCIN 20 MG/G
OINTMENT TOPICAL 2 TIMES DAILY PRN
COMMUNITY
Start: 2023-03-03 | End: 2023-09-13

## 2023-03-22 NOTE — ASSESSMENT & PLAN NOTE
Patient presents for a wellness visit  And c/o pain in R thumb  Please refer to initial intake notes for screening/preventive measures  All histories and immunization schedule reviewed with patient

## 2023-03-22 NOTE — PROGRESS NOTES
Subjective:       Patient ID: Basim Menendez is a 68 y.o. male.    Chief Complaint: Annual Exam (Wellness exam) and Hand Pain (Inc with writing)      Patient is established already .......... presents today for a wellness visit , to discuss labs results and c/o new problem with r thumb,hand    Patient presents for a wellness visit  Also c/o R hand pains  Please refer to initial intake notes for screening/preventive measures  All histories and immunization schedule reviewed with patient            Hand Pain   The incident occurred more than 1 week ago. The incident occurred at work. There was no injury mechanism. The pain is present in the right hand (R thumb). The quality of the pain is described as aching. The pain does not radiate. The pain is at a severity of 4/10. The pain is moderate. The pain has been Fluctuating since the incident. Pertinent negatives include no chest pain, muscle weakness, numbness or tingling. The symptoms are aggravated by movement. He has tried NSAIDs and rest for the symptoms. The treatment provided moderate relief.   Review of Systems   Cardiovascular:  Negative for chest pain.   Musculoskeletal:  Positive for arthralgias and myalgias.   Neurological:  Negative for tingling and numbness.       Objective:      Physical Exam  Vitals and nursing note reviewed.   Constitutional:       Appearance: Normal appearance.   Cardiovascular:      Rate and Rhythm: Normal rate and regular rhythm.      Pulses: Normal pulses.      Heart sounds: Normal heart sounds. No murmur heard.    No friction rub. No gallop.   Pulmonary:      Effort: Pulmonary effort is normal.      Breath sounds: Normal breath sounds. No wheezing.   Abdominal:      General: Abdomen is flat. Bowel sounds are normal.      Palpations: Abdomen is soft.   Musculoskeletal:         General: Normal range of motion.   Skin:     General: Skin is warm and dry.   Neurological:      General: No focal deficit present.      Mental Status:  He is alert and oriented to person, place, and time.   Psychiatric:         Mood and Affect: Mood normal.       Assessment:       1. Encounter for abdominal aortic aneurysm (AAA) screening  -     CV Abdominal Aorta; Future    2. Need for prophylactic vaccination with combined diphtheria-tetanus-pertussis (DTP) vaccine  -     (In Office Administered) Tdap Vaccine    3. Wellness examination  Overview:  He presents for annual exam  Also c/o pain in R thumb after prolonged hand usage    Assessment & Plan:  Patient presents for a wellness visit  And c/o pain in R thumb  Please refer to initial intake notes for screening/preventive measures  All histories and immunization schedule reviewed with patient          4. Thumb joint locking  Overview:  Of R hand after usage, writing at work    Assessment & Plan:  Sec to DJD , possible triggering , tendonitis  Add OTC voltaren  Use alternate methods at work like typing or dictation  Add voltaren gel, use OTC NSAIDs               Plan:       1. Encounter for abdominal aortic aneurysm (AAA) screening  -     CV Abdominal Aorta; Future    2. Need for prophylactic vaccination with combined diphtheria-tetanus-pertussis (DTP) vaccine  -     (In Office Administered) Tdap Vaccine    3. Wellness examination  Overview:  He presents for annual exam  Also c/o pain in R thumb after prolonged hand usage    Assessment & Plan:  Patient presents for a wellness visit  And c/o pain in R thumb  Please refer to initial intake notes for screening/preventive measures  All histories and immunization schedule reviewed with patient          4. Thumb joint locking  Overview:  Of R hand after usage, writing at work    Assessment & Plan:  Sec to DJD , possible triggering , tendonitis  Add OTC voltaren  Use alternate methods at work like typing or dictation  Add voltaren gel, use OTC NSAIDs

## 2023-03-22 NOTE — ASSESSMENT & PLAN NOTE
Sec to DJD , possible triggering , tendonitis  Add OTC voltaren  Use alternate methods at work like typing or dictation  Add voltaren gel, use OTC NSAIDs

## 2023-03-28 ENCOUNTER — TELEPHONE (OUTPATIENT)
Dept: FAMILY MEDICINE | Facility: CLINIC | Age: 68
End: 2023-03-28
Payer: COMMERCIAL

## 2023-03-28 DIAGNOSIS — Z13.6 ENCOUNTER FOR ABDOMINAL AORTIC ANEURYSM (AAA) SCREENING: Primary | ICD-10-CM

## 2023-03-28 NOTE — TELEPHONE ENCOUNTER
----- Message from Ashlie Chris sent at 3/28/2023  1:02 PM CDT -----  Contact: [facility]  Type:  Needs Medical Advice    Who Called: [place]   Would the patient rather a call back or a response via [email or web address]? CALL   Best Call Back Number: OFFICE:  [phone] / FAX:  [phone]   Additional Information: CALLER REQUEST AN ORDER FOR A ULTRA SOUND  AORTA.  PT IS SCHEDULED FOR TOMORROW AT 9AM.

## 2023-04-21 ENCOUNTER — PATIENT MESSAGE (OUTPATIENT)
Dept: PRIMARY CARE CLINIC | Facility: CLINIC | Age: 68
End: 2023-04-21
Payer: COMMERCIAL

## 2023-04-21 DIAGNOSIS — M65.311 TRIGGER FINGER OF RIGHT THUMB: Primary | ICD-10-CM

## 2023-05-02 DIAGNOSIS — I25.10 CORONARY ARTERY DISEASE INVOLVING NATIVE CORONARY ARTERY OF NATIVE HEART WITHOUT ANGINA PECTORIS: ICD-10-CM

## 2023-05-02 RX ORDER — CARVEDILOL 6.25 MG/1
6.25 TABLET ORAL 2 TIMES DAILY
Qty: 180 TABLET | Refills: 3 | Status: SHIPPED | OUTPATIENT
Start: 2023-05-02 | End: 2023-06-16 | Stop reason: SDUPTHER

## 2023-05-02 RX ORDER — ATORVASTATIN CALCIUM 80 MG/1
80 TABLET, FILM COATED ORAL DAILY
Qty: 90 TABLET | Refills: 3 | Status: SHIPPED | OUTPATIENT
Start: 2023-05-02 | End: 2023-10-12 | Stop reason: DRUGHIGH

## 2023-05-02 RX ORDER — RAMIPRIL 10 MG/1
10 CAPSULE ORAL DAILY
Qty: 90 CAPSULE | Refills: 3 | Status: SHIPPED | OUTPATIENT
Start: 2023-05-02 | End: 2023-10-12 | Stop reason: SDUPTHER

## 2023-05-02 RX ORDER — CLOPIDOGREL BISULFATE 75 MG/1
75 TABLET ORAL DAILY
Qty: 90 TABLET | Refills: 3 | Status: SHIPPED | OUTPATIENT
Start: 2023-05-02 | End: 2023-10-12 | Stop reason: SDUPTHER

## 2023-05-10 ENCOUNTER — OFFICE VISIT (OUTPATIENT)
Dept: FAMILY MEDICINE | Facility: CLINIC | Age: 68
End: 2023-05-10
Payer: COMMERCIAL

## 2023-05-10 VITALS
BODY MASS INDEX: 24.18 KG/M2 | HEIGHT: 70 IN | SYSTOLIC BLOOD PRESSURE: 130 MMHG | HEART RATE: 65 BPM | DIASTOLIC BLOOD PRESSURE: 70 MMHG | WEIGHT: 168.88 LBS | OXYGEN SATURATION: 96 % | RESPIRATION RATE: 18 BRPM | TEMPERATURE: 98 F

## 2023-05-10 DIAGNOSIS — H01.021 SQUAMOUS BLEPHARITIS OF RIGHT UPPER EYELID: ICD-10-CM

## 2023-05-10 PROCEDURE — 3008F BODY MASS INDEX DOCD: CPT | Mod: CPTII,S$GLB,, | Performed by: INTERNAL MEDICINE

## 2023-05-10 PROCEDURE — 1160F RVW MEDS BY RX/DR IN RCRD: CPT | Mod: CPTII,S$GLB,, | Performed by: INTERNAL MEDICINE

## 2023-05-10 PROCEDURE — 4010F PR ACE/ARB THEARPY RXD/TAKEN: ICD-10-PCS | Mod: CPTII,S$GLB,, | Performed by: INTERNAL MEDICINE

## 2023-05-10 PROCEDURE — 3075F SYST BP GE 130 - 139MM HG: CPT | Mod: CPTII,S$GLB,, | Performed by: INTERNAL MEDICINE

## 2023-05-10 PROCEDURE — 4010F ACE/ARB THERAPY RXD/TAKEN: CPT | Mod: CPTII,S$GLB,, | Performed by: INTERNAL MEDICINE

## 2023-05-10 PROCEDURE — 3008F PR BODY MASS INDEX (BMI) DOCUMENTED: ICD-10-PCS | Mod: CPTII,S$GLB,, | Performed by: INTERNAL MEDICINE

## 2023-05-10 PROCEDURE — 1159F MED LIST DOCD IN RCRD: CPT | Mod: CPTII,S$GLB,, | Performed by: INTERNAL MEDICINE

## 2023-05-10 PROCEDURE — 99213 PR OFFICE/OUTPT VISIT, EST, LEVL III, 20-29 MIN: ICD-10-PCS | Mod: S$GLB,,, | Performed by: INTERNAL MEDICINE

## 2023-05-10 PROCEDURE — 1159F PR MEDICATION LIST DOCUMENTED IN MEDICAL RECORD: ICD-10-PCS | Mod: CPTII,S$GLB,, | Performed by: INTERNAL MEDICINE

## 2023-05-10 PROCEDURE — 3075F PR MOST RECENT SYSTOLIC BLOOD PRESS GE 130-139MM HG: ICD-10-PCS | Mod: CPTII,S$GLB,, | Performed by: INTERNAL MEDICINE

## 2023-05-10 PROCEDURE — 1160F PR REVIEW ALL MEDS BY PRESCRIBER/CLIN PHARMACIST DOCUMENTED: ICD-10-PCS | Mod: CPTII,S$GLB,, | Performed by: INTERNAL MEDICINE

## 2023-05-10 PROCEDURE — 3078F DIAST BP <80 MM HG: CPT | Mod: CPTII,S$GLB,, | Performed by: INTERNAL MEDICINE

## 2023-05-10 PROCEDURE — 99213 OFFICE O/P EST LOW 20 MIN: CPT | Mod: S$GLB,,, | Performed by: INTERNAL MEDICINE

## 2023-05-10 PROCEDURE — 99999 PR PBB SHADOW E&M-EST. PATIENT-LVL III: CPT | Mod: PBBFAC,,, | Performed by: INTERNAL MEDICINE

## 2023-05-10 PROCEDURE — 3078F PR MOST RECENT DIASTOLIC BLOOD PRESSURE < 80 MM HG: ICD-10-PCS | Mod: CPTII,S$GLB,, | Performed by: INTERNAL MEDICINE

## 2023-05-10 PROCEDURE — 99999 PR PBB SHADOW E&M-EST. PATIENT-LVL III: ICD-10-PCS | Mod: PBBFAC,,, | Performed by: INTERNAL MEDICINE

## 2023-05-10 RX ORDER — ERYTHROMYCIN 5 MG/G
OINTMENT OPHTHALMIC NIGHTLY
Qty: 3.5 G | Refills: 0 | Status: SHIPPED | OUTPATIENT
Start: 2023-05-10 | End: 2023-05-17

## 2023-05-10 NOTE — PROGRESS NOTES
Subjective:       Patient ID: Basim Menendez is a 68 y.o. male.    Chief Complaint: Eye Problem (1 day)      Patient is established already .......... presents today with a c/o R eye inflammation, swelling    Eye Problem   The right eye is affected. This is a new problem. The current episode started in the past 7 days. The problem occurs constantly. The problem has been unchanged. There was no injury mechanism. There is No known exposure to pink eye. He Does not wear contacts. Associated symptoms include itching. He has tried nothing for the symptoms.   Review of Systems   Eyes:  Positive for itching.       Objective:      Physical Exam  Eyes:      Comments: R upper eye lid : erythematous, swollen       Assessment:       1. Squamous blepharitis of right upper eyelid  Overview:  Of few days duration    Assessment & Plan:  Warm compresses  Genta or e-mycin oint                 Plan:       1. Squamous blepharitis of right upper eyelid  Overview:  Of few days duration    Assessment & Plan:  Warm compresses  Genta or e-mycin oint

## 2023-06-16 RX ORDER — CARVEDILOL 6.25 MG/1
6.25 TABLET ORAL 2 TIMES DAILY
Qty: 180 TABLET | Refills: 3 | Status: SHIPPED | OUTPATIENT
Start: 2023-06-16 | End: 2023-10-12 | Stop reason: SDUPTHER

## 2023-06-26 PROBLEM — Z00.00 WELLNESS EXAMINATION: Status: RESOLVED | Noted: 2019-04-25 | Resolved: 2023-06-26

## 2023-09-13 ENCOUNTER — OFFICE VISIT (OUTPATIENT)
Dept: FAMILY MEDICINE | Facility: CLINIC | Age: 68
End: 2023-09-13
Payer: COMMERCIAL

## 2023-09-13 VITALS
HEIGHT: 70 IN | HEART RATE: 54 BPM | OXYGEN SATURATION: 96 % | SYSTOLIC BLOOD PRESSURE: 130 MMHG | WEIGHT: 170.5 LBS | BODY MASS INDEX: 24.41 KG/M2 | DIASTOLIC BLOOD PRESSURE: 78 MMHG

## 2023-09-13 DIAGNOSIS — I10 ESSENTIAL HYPERTENSION, BENIGN: ICD-10-CM

## 2023-09-13 DIAGNOSIS — N40.0 BENIGN PROSTATIC HYPERPLASIA, UNSPECIFIED WHETHER LOWER URINARY TRACT SYMPTOMS PRESENT: Primary | ICD-10-CM

## 2023-09-13 DIAGNOSIS — Z00.00 PREVENTATIVE HEALTH CARE: ICD-10-CM

## 2023-09-13 DIAGNOSIS — R00.1 BRADYCARDIA: ICD-10-CM

## 2023-09-13 DIAGNOSIS — E78.5 HYPERLIPIDEMIA, UNSPECIFIED HYPERLIPIDEMIA TYPE: ICD-10-CM

## 2023-09-13 PROCEDURE — 1160F PR REVIEW ALL MEDS BY PRESCRIBER/CLIN PHARMACIST DOCUMENTED: ICD-10-PCS | Mod: CPTII,S$GLB,, | Performed by: INTERNAL MEDICINE

## 2023-09-13 PROCEDURE — 4010F ACE/ARB THERAPY RXD/TAKEN: CPT | Mod: CPTII,S$GLB,, | Performed by: INTERNAL MEDICINE

## 2023-09-13 PROCEDURE — 3078F DIAST BP <80 MM HG: CPT | Mod: CPTII,S$GLB,, | Performed by: INTERNAL MEDICINE

## 2023-09-13 PROCEDURE — 1159F PR MEDICATION LIST DOCUMENTED IN MEDICAL RECORD: ICD-10-PCS | Mod: CPTII,S$GLB,, | Performed by: INTERNAL MEDICINE

## 2023-09-13 PROCEDURE — 1160F RVW MEDS BY RX/DR IN RCRD: CPT | Mod: CPTII,S$GLB,, | Performed by: INTERNAL MEDICINE

## 2023-09-13 PROCEDURE — 99213 PR OFFICE/OUTPT VISIT, EST, LEVL III, 20-29 MIN: ICD-10-PCS | Mod: S$GLB,,, | Performed by: INTERNAL MEDICINE

## 2023-09-13 PROCEDURE — 1126F PR PAIN SEVERITY QUANTIFIED, NO PAIN PRESENT: ICD-10-PCS | Mod: CPTII,S$GLB,, | Performed by: INTERNAL MEDICINE

## 2023-09-13 PROCEDURE — 3075F PR MOST RECENT SYSTOLIC BLOOD PRESS GE 130-139MM HG: ICD-10-PCS | Mod: CPTII,S$GLB,, | Performed by: INTERNAL MEDICINE

## 2023-09-13 PROCEDURE — 3078F PR MOST RECENT DIASTOLIC BLOOD PRESSURE < 80 MM HG: ICD-10-PCS | Mod: CPTII,S$GLB,, | Performed by: INTERNAL MEDICINE

## 2023-09-13 PROCEDURE — 4010F PR ACE/ARB THEARPY RXD/TAKEN: ICD-10-PCS | Mod: CPTII,S$GLB,, | Performed by: INTERNAL MEDICINE

## 2023-09-13 PROCEDURE — 3008F PR BODY MASS INDEX (BMI) DOCUMENTED: ICD-10-PCS | Mod: CPTII,S$GLB,, | Performed by: INTERNAL MEDICINE

## 2023-09-13 PROCEDURE — 1159F MED LIST DOCD IN RCRD: CPT | Mod: CPTII,S$GLB,, | Performed by: INTERNAL MEDICINE

## 2023-09-13 PROCEDURE — 99213 OFFICE O/P EST LOW 20 MIN: CPT | Mod: S$GLB,,, | Performed by: INTERNAL MEDICINE

## 2023-09-13 PROCEDURE — 3075F SYST BP GE 130 - 139MM HG: CPT | Mod: CPTII,S$GLB,, | Performed by: INTERNAL MEDICINE

## 2023-09-13 PROCEDURE — 1126F AMNT PAIN NOTED NONE PRSNT: CPT | Mod: CPTII,S$GLB,, | Performed by: INTERNAL MEDICINE

## 2023-09-13 PROCEDURE — 3008F BODY MASS INDEX DOCD: CPT | Mod: CPTII,S$GLB,, | Performed by: INTERNAL MEDICINE

## 2023-09-13 NOTE — PROGRESS NOTES
Subjective:       Patient ID: Basim Menendez is a 68 y.o. male.    Chief Complaint: Follow-up      Patient is established already .......... presents today for a f/u visit , to discuss new labs and for f/u on BP, HR    Follow-up  This is a chronic problem. The current episode started more than 1 year ago. The problem has been unchanged. Pertinent negatives include no fever. Treatments tried: See MAR.     Review of Systems   Constitutional:  Negative for activity change, appetite change and fever.   Respiratory: Negative.     Cardiovascular: Negative.    Gastrointestinal: Negative.    Musculoskeletal: Negative.          Objective:      Physical Exam  Vitals and nursing note reviewed.   Constitutional:       Appearance: Normal appearance.   Cardiovascular:      Rate and Rhythm: Normal rate and regular rhythm.      Pulses: Normal pulses.      Heart sounds: Normal heart sounds. No murmur heard.     No friction rub. No gallop.   Pulmonary:      Effort: Pulmonary effort is normal.      Breath sounds: Normal breath sounds. No wheezing.   Skin:     General: Skin is warm and dry.   Neurological:      Mental Status: He is alert.   Psychiatric:         Mood and Affect: Mood normal.         Assessment:       1. Benign prostatic hyperplasia, unspecified whether lower urinary tract symptoms present  -     Prostate Specific Antigen, Diagnostic; Future; Expected date: 09/13/2023    2. Essential hypertension, benign  -     Comprehensive Metabolic Panel; Future; Expected date: 09/13/2023    3. Hyperlipidemia, unspecified hyperlipidemia type  -     Lipid Panel; Future; Expected date: 09/13/2023    4. Bradycardia  Overview:  asypmtomatic    Assessment & Plan:  Monitor BP  Sec to coreg      5. Preventative health care  Overview:  He had his annual exam earlier this year      Assessment & Plan:  Get new COVID vaccine  Get prevnar 20 & flu vaccines             Plan:       1. Benign prostatic hyperplasia, unspecified whether lower  urinary tract symptoms present  -     Prostate Specific Antigen, Diagnostic; Future; Expected date: 09/13/2023    2. Essential hypertension, benign  -     Comprehensive Metabolic Panel; Future; Expected date: 09/13/2023    3. Hyperlipidemia, unspecified hyperlipidemia type  -     Lipid Panel; Future; Expected date: 09/13/2023    4. Bradycardia  Overview:  asypmtomatic    Assessment & Plan:  Monitor BP  Sec to coreg      5. Preventative health care  Overview:  He had his annual exam earlier this year      Assessment & Plan:  Get new COVID vaccine  Get prevnar 20 & flu vaccines

## 2023-09-14 ENCOUNTER — PATIENT OUTREACH (OUTPATIENT)
Dept: ADMINISTRATIVE | Facility: HOSPITAL | Age: 68
End: 2023-09-14
Payer: COMMERCIAL

## 2023-10-12 ENCOUNTER — OFFICE VISIT (OUTPATIENT)
Dept: CARDIOLOGY | Facility: CLINIC | Age: 68
End: 2023-10-12
Payer: COMMERCIAL

## 2023-10-12 VITALS
BODY MASS INDEX: 24.11 KG/M2 | HEART RATE: 51 BPM | HEIGHT: 70 IN | WEIGHT: 168.38 LBS | OXYGEN SATURATION: 99 % | SYSTOLIC BLOOD PRESSURE: 142 MMHG | DIASTOLIC BLOOD PRESSURE: 81 MMHG

## 2023-10-12 DIAGNOSIS — Z95.5 S/P DRUG ELUTING CORONARY STENT PLACEMENT: ICD-10-CM

## 2023-10-12 DIAGNOSIS — I10 ESSENTIAL HYPERTENSION: Chronic | ICD-10-CM

## 2023-10-12 DIAGNOSIS — T50.905A BRADYCARDIA, DRUG INDUCED: ICD-10-CM

## 2023-10-12 DIAGNOSIS — I20.9 ANGINA PECTORIS: ICD-10-CM

## 2023-10-12 DIAGNOSIS — I25.118 CORONARY ARTERY DISEASE OF NATIVE ARTERY OF NATIVE HEART WITH STABLE ANGINA PECTORIS: Primary | ICD-10-CM

## 2023-10-12 DIAGNOSIS — E78.5 DYSLIPIDEMIA: Chronic | ICD-10-CM

## 2023-10-12 DIAGNOSIS — R00.1 BRADYCARDIA, DRUG INDUCED: ICD-10-CM

## 2023-10-12 PROCEDURE — 3044F HG A1C LEVEL LT 7.0%: CPT | Mod: CPTII,S$GLB,, | Performed by: INTERNAL MEDICINE

## 2023-10-12 PROCEDURE — 3079F PR MOST RECENT DIASTOLIC BLOOD PRESSURE 80-89 MM HG: ICD-10-PCS | Mod: CPTII,S$GLB,, | Performed by: INTERNAL MEDICINE

## 2023-10-12 PROCEDURE — 93000 ELECTROCARDIOGRAM COMPLETE: CPT | Mod: S$GLB,,, | Performed by: INTERNAL MEDICINE

## 2023-10-12 PROCEDURE — 4010F ACE/ARB THERAPY RXD/TAKEN: CPT | Mod: CPTII,S$GLB,, | Performed by: INTERNAL MEDICINE

## 2023-10-12 PROCEDURE — 3077F PR MOST RECENT SYSTOLIC BLOOD PRESSURE >= 140 MM HG: ICD-10-PCS | Mod: CPTII,S$GLB,, | Performed by: INTERNAL MEDICINE

## 2023-10-12 PROCEDURE — 99215 OFFICE O/P EST HI 40 MIN: CPT | Mod: S$GLB,,, | Performed by: INTERNAL MEDICINE

## 2023-10-12 PROCEDURE — 99999 PR PBB SHADOW E&M-EST. PATIENT-LVL III: ICD-10-PCS | Mod: PBBFAC,,, | Performed by: INTERNAL MEDICINE

## 2023-10-12 PROCEDURE — 3008F PR BODY MASS INDEX (BMI) DOCUMENTED: ICD-10-PCS | Mod: CPTII,S$GLB,, | Performed by: INTERNAL MEDICINE

## 2023-10-12 PROCEDURE — 3044F PR MOST RECENT HEMOGLOBIN A1C LEVEL <7.0%: ICD-10-PCS | Mod: CPTII,S$GLB,, | Performed by: INTERNAL MEDICINE

## 2023-10-12 PROCEDURE — 4010F PR ACE/ARB THEARPY RXD/TAKEN: ICD-10-PCS | Mod: CPTII,S$GLB,, | Performed by: INTERNAL MEDICINE

## 2023-10-12 PROCEDURE — 1101F PR PT FALLS ASSESS DOC 0-1 FALLS W/OUT INJ PAST YR: ICD-10-PCS | Mod: CPTII,S$GLB,, | Performed by: INTERNAL MEDICINE

## 2023-10-12 PROCEDURE — 1159F MED LIST DOCD IN RCRD: CPT | Mod: CPTII,S$GLB,, | Performed by: INTERNAL MEDICINE

## 2023-10-12 PROCEDURE — 3288F FALL RISK ASSESSMENT DOCD: CPT | Mod: CPTII,S$GLB,, | Performed by: INTERNAL MEDICINE

## 2023-10-12 PROCEDURE — 3077F SYST BP >= 140 MM HG: CPT | Mod: CPTII,S$GLB,, | Performed by: INTERNAL MEDICINE

## 2023-10-12 PROCEDURE — 1101F PT FALLS ASSESS-DOCD LE1/YR: CPT | Mod: CPTII,S$GLB,, | Performed by: INTERNAL MEDICINE

## 2023-10-12 PROCEDURE — 3079F DIAST BP 80-89 MM HG: CPT | Mod: CPTII,S$GLB,, | Performed by: INTERNAL MEDICINE

## 2023-10-12 PROCEDURE — 99215 PR OFFICE/OUTPT VISIT, EST, LEVL V, 40-54 MIN: ICD-10-PCS | Mod: S$GLB,,, | Performed by: INTERNAL MEDICINE

## 2023-10-12 PROCEDURE — 1160F PR REVIEW ALL MEDS BY PRESCRIBER/CLIN PHARMACIST DOCUMENTED: ICD-10-PCS | Mod: CPTII,S$GLB,, | Performed by: INTERNAL MEDICINE

## 2023-10-12 PROCEDURE — 3008F BODY MASS INDEX DOCD: CPT | Mod: CPTII,S$GLB,, | Performed by: INTERNAL MEDICINE

## 2023-10-12 PROCEDURE — 3288F PR FALLS RISK ASSESSMENT DOCUMENTED: ICD-10-PCS | Mod: CPTII,S$GLB,, | Performed by: INTERNAL MEDICINE

## 2023-10-12 PROCEDURE — 1160F RVW MEDS BY RX/DR IN RCRD: CPT | Mod: CPTII,S$GLB,, | Performed by: INTERNAL MEDICINE

## 2023-10-12 PROCEDURE — 1159F PR MEDICATION LIST DOCUMENTED IN MEDICAL RECORD: ICD-10-PCS | Mod: CPTII,S$GLB,, | Performed by: INTERNAL MEDICINE

## 2023-10-12 PROCEDURE — 99999 PR PBB SHADOW E&M-EST. PATIENT-LVL III: CPT | Mod: PBBFAC,,, | Performed by: INTERNAL MEDICINE

## 2023-10-12 PROCEDURE — 93000 EKG 12-LEAD: ICD-10-PCS | Mod: S$GLB,,, | Performed by: INTERNAL MEDICINE

## 2023-10-12 RX ORDER — RAMIPRIL 10 MG/1
10 CAPSULE ORAL DAILY
Qty: 90 CAPSULE | Refills: 3 | Status: SHIPPED | OUTPATIENT
Start: 2023-10-12 | End: 2023-12-27 | Stop reason: SDUPTHER

## 2023-10-12 RX ORDER — CLOPIDOGREL BISULFATE 75 MG/1
75 TABLET ORAL DAILY
Qty: 90 TABLET | Refills: 3 | Status: SHIPPED | OUTPATIENT
Start: 2023-10-12 | End: 2023-12-27 | Stop reason: SDUPTHER

## 2023-10-12 RX ORDER — ERYTHROMYCIN 5 MG/G
OINTMENT OPHTHALMIC
COMMUNITY
End: 2023-11-15 | Stop reason: SDUPTHER

## 2023-10-12 RX ORDER — ASPIRIN 81 MG/1
1 TABLET ORAL DAILY
COMMUNITY
End: 2023-10-12

## 2023-10-12 RX ORDER — ATORVASTATIN CALCIUM 40 MG/1
40 TABLET, FILM COATED ORAL NIGHTLY
COMMUNITY
Start: 2023-05-01 | End: 2023-10-12 | Stop reason: SDUPTHER

## 2023-10-12 RX ORDER — ATORVASTATIN CALCIUM 40 MG/1
40 TABLET, FILM COATED ORAL NIGHTLY
Qty: 90 TABLET | Refills: 3 | Status: SHIPPED | OUTPATIENT
Start: 2023-10-12 | End: 2023-12-27 | Stop reason: SDUPTHER

## 2023-10-12 RX ORDER — NEOMYCIN SULFATE, POLYMYXIN B SULFATE, AND DEXAMETHASONE 3.5; 10000; 1 MG/G; [USP'U]/G; MG/G
OINTMENT OPHTHALMIC
COMMUNITY
Start: 2023-08-25

## 2023-10-12 RX ORDER — DOXYCYCLINE HYCLATE 100 MG
TABLET ORAL
COMMUNITY
End: 2023-10-12 | Stop reason: ALTCHOICE

## 2023-10-12 RX ORDER — CARVEDILOL 6.25 MG/1
6.25 TABLET ORAL 2 TIMES DAILY
Qty: 180 TABLET | Refills: 3 | Status: SHIPPED | OUTPATIENT
Start: 2023-10-12 | End: 2023-12-27 | Stop reason: SDUPTHER

## 2023-10-12 RX ORDER — CLOPIDOGREL 300 MG/1
TABLET, FILM COATED ORAL
COMMUNITY
End: 2023-10-12 | Stop reason: ALTCHOICE

## 2023-10-12 RX ORDER — AMOXICILLIN 500 MG/1
CAPSULE ORAL
COMMUNITY
End: 2023-10-12 | Stop reason: ALTCHOICE

## 2023-10-12 NOTE — PROGRESS NOTES
"Subjective:    Patient ID:  Basim Menendez is a 68 y.o. male who presents for evaluation of Annual Exam  For complex PCI of RCA, 2/1 to 2/3/2016, some chest tightness with running  PCP: Dr. TAMIKA Wang Loraine, started in 2017  Eye: Maria Victoria Cruz  Prior Cardiologist: Dr. 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 Looking to move back to [place].    Last seen 4/2021, recommended at least annual follow up!    Health literacy: medium  Vaccinations: up-to-date including COVID, no infection  Activities: to gym or jog, 5 days weekly for an hour, noted some chest tightness, max intensity 3/10  Nicotine: quit 1999, 20 py  Alcohol: socially, 1-2 per week, max 1 in 24 hours.  Illicit drugs: none  Cardiac symptoms: stable angina  Home BP: 120/78  Medication compliance: yes, do not like follow up, do not skip  Diet: regular, no added salt  Caffeine: 3-4 cpd, sleep OK  Labs: 2/2016, LDL 58, on Rx, recent lab result not available, told of good results  No results found for: "TSH"     Lab Results   Component Value Date    HGBA1C 5.3 02/28/2023       Lab Results   Component Value Date    WBC 4.9 03/31/2022    HGB 14.0 03/31/2022    HCT 42.2 03/31/2022    MCV 90.6 03/31/2022     03/31/2022       CMP  Sodium   Date Value Ref Range Status   03/31/2022 136 136 - 145 mmol/L Final   02/03/2016 140 136 - 145 mmol/L Final     Potassium   Date Value Ref Range Status   03/31/2022 4.1 3.5 - 5.1 mmol/L Final   02/03/2016 4.4 3.5 - 5.1 mmol/L Final     Chloride   Date Value Ref Range Status   03/31/2022 104 98 - 107 mmol/L Final   02/03/2016 108 95 - 110 mmol/L Final     CO2   Date Value Ref Range Status   03/31/2022 29 21 - 32 mmol/L Final   02/03/2016 26 23 - 29 mmol/L Final     Glucose   Date Value Ref Range Status   02/03/2016 103 70 - 110 mg/dL Final     BUN   Date Value Ref Range Status   03/31/2022 " 17 7 - 18 mg/dL Final   02/03/2016 6 6 - 20 mg/dL Final     Creatinine   Date Value Ref Range Status   03/31/2022 0.90 0.70 - 1.30 mg/dL Final   02/03/2016 0.8 0.5 - 1.4 mg/dL Final     Calcium   Date Value Ref Range Status   03/31/2022 9.2 8.5 - 10.1 mg/dL Final   02/03/2016 8.9 8.7 - 10.5 mg/dL Final     Total Protein   Date Value Ref Range Status   02/01/2016 6.2 6.0 - 8.4 g/dL Final     Albumin   Date Value Ref Range Status   02/01/2016 4.0 3.5 - 5.2 g/dL Final     Albumin Level   Date Value Ref Range Status   03/31/2022 3.8 3.4 - 5.0 g/dL Final     Total Bilirubin   Date Value Ref Range Status   02/01/2016 1.0 0.1 - 1.0 mg/dL Final     Comment:     For infants and newborns, interpretation of results should be based  on gestational age, weight and in agreement with clinical  observations.  Premature Infant recommended reference ranges:  Up to 24 hours.............<8.0 mg/dL  Up to 48 hours............<12.0 mg/dL  3-5 days..................<15.0 mg/dL  6-29 days.................<15.0 mg/dL       Alkaline Phosphatase   Date Value Ref Range Status   02/01/2016 55 55 - 135 U/L Final     Alk Phos   Date Value Ref Range Status   03/31/2022 77 45 - 117 IU/L Final     AST   Date Value Ref Range Status   02/01/2016 27 10 - 40 U/L Final     Aspartate Aminotransferase   Date Value Ref Range Status   03/31/2022 24 15 - 37 IU/L Final     ALT   Date Value Ref Range Status   02/01/2016 25 10 - 44 U/L Final     Alanine Aminotransferase   Date Value Ref Range Status   03/31/2022 42 16 - 61 IU/L Final     Anion Gap   Date Value Ref Range Status   02/03/2016 6 (L) 8 - 16 mmol/L Final     eGFR if    Date Value Ref Range Status   02/03/2016 >60.0 >60 mL/min/1.73 m^2 Final     eGFR    Date Value Ref Range Status   03/31/2022 >60 >60 mL/min/1.73m2 Final     eGFR if non    Date Value Ref Range Status   02/03/2016 >60.0 >60 mL/min/1.73 m^2 Final     Comment:     Calculation used to obtain  "the estimated glomerular filtration  rate (eGFR) is the CKD-EPI equation. Since race is unknown   in our information system, the eGFR values for   -American and Non--American patients are given   for each creatinine result.       eGFR   Date Value Ref Range Status   2022 >60 >60 mL/min/1.73m2 Final     @labrcntip(troponini)@  No results found for: "BNP"}   Lab Results   Component Value Date    CHOL 122 2023    CHOL 123 2016    CHOL 271 (H) 2015     Lab Results   Component Value Date    HDL 41 2023    HDL 50 2016    HDL 63 2015     Lab Results   Component Value Date    LDLCALC 56 2023    LDLCALC 58.0 (L) 2016    LDLCALC 180.4 (H) 2015     Lab Results   Component Value Date    TRIG 125 2023    TRIG 75 2016    TRIG 138 2015     Lab Results   Component Value Date    CHOLHDL 40.7 2016    CHOLHDL 23.2 2015    CHOLHDL 25.0 2014     Outside labs reviewed 3/2021: LDL-C 70 on atorvastatin 40 mg, normal A!C, CMP, TSH and no microalbuminuria, have elevated PSA   2023 negative for microalbuminuria    Last Echo: TMET Echo 2021  Last stress test:   Cardiovascular angiogram: 2016 with PCI  ECG: SB, rate 53  Fundoscopic exam: yearly, no retinopathy    In 2016:  DCS - "Attending Physician: Celestina Craig MD     Reason for Admission: CAD transfer for PCI     Procedures Performed: Procedure(s) (LRB):  HEART CATH-LEFT (Left)  [id number]-[id number]-CORONARY (PTCA) with rotablator (Left)    Hospital Course HPI: Basim Menendez is a 60 y.o. male with ho HTN, dyslipidemia and fam hx of heart disease (father  of MI at 56, he also had type 1 DM). Pt transferred from [facility] for PCI if PDA which was unsuccessful at OSH, he also had successful YOVANY placement to the distal RCA (CD is on chart and was reviewed by interventional cardiology- Dr Boland). No other records are available except for " "cath CD. Pt says cath was elective and done because of an abnormal stress test. He has been having exertional chest pain for the past year. He describes tightness that sometimes only lasts seconds but can last several minutes, it sometimes resolves with continued exercise other times resolves with rest. He denies associated dyspnea, nausea, vomiting, diaphoresis or palpitations.    Course: Patient admitted to the Cardiology Team B Service and placed on 24 hour telemetry  //CAD- on admit continued Aggrastat, heparin and Brilinta. Coreg and Crestor were started. Patient remained chest pain free throughout hospital stay. 2D echo done and EF preserved: 55-60%. S/p PCI of RCA/PDA with rotablator and YOVANY. Will discharge today with follow up with Dr. Dueñas with [facility] in [place] at patients request and Cardiac Rehab. Patient educated on lifestyle modifications and importance of compliance with medications.    //HTN--Coreg started on admit and -160's. Will add low dose diuretic on discharge.  //DLD -takes crestor at home. Unsure of dose (pt does not have list or pill bottles with him). Would continue current dose as lipids are controlled  -total chol 123 and LDL 58"    ECHO 2/2016 CONCLUSIONS     1 - Normal left ventricular systolic function (EF 55-60%).     2 - Concentric remodeling. LV wall thickness is normal, with the septum measuring 1.2 cm and the posterior wall measuring 1.1 cm across.    3 - Normal left ventricular diastolic function.     4 - Normal right ventricular systolic function .     5 - Trivial mitral regurgitation.     6 - Intermediate central venous pressure.     PCI, 2/2/2016  HEMODYNAMIC RESULTS:    BP: 102/60    C. ANGIOGRAPHIC RESULTS:    DIAGNOSTIC:       Patient has a right dominant coronary artery.        - Right Coronary Artery:             The RCA is diffusely diseased. There is XOCHILT 3 flow.               The proximal RCA has a 75% stenosis. There is XOCHILT 3 flow.       - Posterior Descending " Artery:             The proximal PDA has a 90% stenosis. There is XOCHILT 3 flow.               The proximal PDA has a 90% stenosis. There is XOCHILT 3 flow.               The distal PDA has luminal irregularities. There is XOCHILT 3 flow.       - Right Ventricle:             The right ventricle was not studied.       - Common Femoral Artery:             The left CFA has plaque. There is XOCHILT 3 flow.       - Femoral Artery:             The left femoral artery has plaque. There is XOCHILT 3 flow.    INTERVENTION:         RCA:              The intervention of the lesion was unsuccessful, secondary to . The vessel is diffusely diseased. The vessel was accessed natively.  The following items were used: Melba And Advancer Combo 1.50.       Proximal RCA:              The lesion was successfully intervened. Post-stenosis of 0%, post-XOCHILT 3 flow and TMP grade 3. The vessel was accessed natively.  The following items were used: Sprinter Balln Otw 2.0 X 10, 2.5X15 Voyager Rx Balloon, Stent Resolute Rx 3.0x38   (YOVANY) and Stent Resolute Rx 3.00x15 (YOVANY).       Proximal PDA:              The lesion was successfully intervened. Post-stenosis of 40%, post-XOCHILT 3 flow and TMP grade 3. The vessel was accessed natively.  The following items were used: Ventura And Advancer Combo 1.50.       Proximal PDA:              The lesion was successfully intervened. Post-stenosis of 0%, post-XOCHILT 3 flow and TMP grade 3. The vessel was accessed natively.  The following items were used: 2.5X15 Voyager Rx Balloon, Stent Resolute Rx 2.75x18 (YOVANY) and 2.5X08 Voyager NC   Balloon.       Distal PDA:              The following items were used: Sprinter Balln Otw 2.0 X 10 and Rotablator Wire.       Right Ventricle:              The following items were used: 5FR Heart Pacer Cath.    D. SUMMARY/POST-OPERATIVE DIAGNOSIS:    1. Successful PCI.  2. Rotoblstor and stenting of RCA in multiple segments.    E. RECOMMENDATIONS:    1. Routine post-cath care.  2. Continue medical  management.  3. Risk factor reductions.  4. ASA 81mg.  5. Ticagrelor (Brilinta) for one year.  6. Follow up with Dr. Fabiano Zabala.    Since DC no further sharp CP, able to bike for 16 miles this past weekend without problem.    Since visit of 2/18/2016, have seasonal allergies, on Z-Pack, which is helpful. Some intermittent very brief and minor CP. Home BP log reviewed 111-177/61-73, mostly under SBP of 130. Compliant with medications. At the gym daily for an hour. Have done 2 sessions of Yoga, enjoy it. To start Cardiac Rehab 4/1.    In 7/2016, completed 2 weeks auto trip to [place], jogged for 2 miles over 30 minutes, also walked 2 9-hole golf course without difficulties, still very occasional brief chest discomfort, no inciting events. Home BP log 118-137/67-77. No problem with medications. All reviewed, timeline discussed. Enjoy playing ice hockey, season start late October, advised to complete at least a year DAPT.    In 4/2019, here for cardiac review, had change in insurance and no follow up since 7/2016. No heart worries. Had recent change in BP regiment to Ramipril 5 mg daily. Discussed HTN Rx, current target < 130/80.    In 4/2021, return after 66 th birthday, wants to comply now. Report stable angina with running for past 2 years, usually at the end of the run. Compliant with medications but have not checked home BP.     HPI comments: in 10/2023, return for follow up with continue stable angina, about once a month. Brief, just seconds. No other heart worries.     TMET Echo 12/2021 - The patient's exercise capacity was excellent. Achieved 17 METS.  The patient reached the end of the protocol.  During stress, the following significant arrhythmias were observed: rare PVCs.  The left ventricle is normal in size with concentric remodeling and normal systolic function.  The estimated ejection fraction is 58%.  Normal left ventricular diastolic function.  Normal right ventricular size with normal right  ventricular systolic function.  Small pericardial effusion.  Mild right atrial enlargement.  The stress echo portion of this study is negative for myocardial ischemia.  The ECG portion of this study is abnormal but not diagnostic for ischemia.  Coreg 6.25 mg taken the evening prior to this examination.  Patient reported shortness of breath during stress. Shortness of breath reported by the patient was mild.  No significant change from Echo in 2/2016.    Abdominal US - 3/2023 - NO SONOGRAPHIC EVIDENCE OF ABDOMINAL AORTIC ANEURYSM     CXR 3/2022 - Hyperinflated lungs, with flattening of the hemidiaphragms and some   biapical, pleural-parenchymal scarring.  No pneumothorax.  No pleural   effusion.  Cardiac silhouette and mediastinal structures unremarkable,     Review of Systems   Constitution: Negative for diaphoresis, no weakness, malaise/fatigue, night sweats and weight stable from 4/2019.   HENT: Negative for headaches, nosebleeds and tinnitus. Had sore throat   Eyes: no further redness. Negative for visual disturbance.   Cardiovascular: positive for chest pain. Negative for claudication, cyanosis, dyspnea on exertion, irregular heartbeat, leg swelling, near-syncope, orthopnea, palpitations and paroxysmal nocturnal dyspnea.   Respiratory: no cough now (likely seasonal allergies), no shortness of breath, sleep disturbances due to breathing, snoring and wheezing. Worthington score 3 , today a 2  Endocrine: Negative for polydipsia and polyuria.   Hematologic/Lymphatic: Does not bruise/bleed easily.   Skin: Negative for nail changes, color change, flushing, poor wound healing and suspicious lesions.   Musculoskeletal: Positive for arthritis and back pain. Negative for falls, gout, joint pain, joint swelling, muscle cramps, muscle weakness and myalgias.   Gastrointestinal: Negative for heartburn, hematemesis, hematochezia, melena and nausea.   Neurological: Negative for disturbances in coordination, excessive daytime  "sleepiness, dizziness, focal weakness, light-headedness, loss of balance, numbness and vertigo.   Psychiatric/Behavioral: Negative for depression and substance abuse. The patient does not have insomnia and is not nervous/anxious.         Objective:    Physical Exam   Constitutional: He is oriented to person, place, and time. He appears well-developed and well-nourished. RA O2 saturation 99%   HENT:   Head: Normocephalic.   Eyes: Conjunctivae and EOM are normal. Pupils are equal, round, and reactive to light.   Neck: Normal range of motion. Neck supple. No JVD present. No thyromegaly present.   Circumference 15.5"   Cardiovascular: Normal rate, regular rhythm, normal heart sounds and intact distal pulses.  Exam reveals no gallop and no friction rub.    No murmur heard.  Pulmonary/Chest: Effort normal and breath sounds with prolong expiration. He has no rales. He exhibits no tenderness.   Abdominal: Soft. Bowel sounds are normal. There is no tenderness.   Waist 32.5" increased to 34.5", hip 39.75"   Musculoskeletal: Normal range of motion. He exhibits no edema.   Lymphadenopathy:     He has no cervical adenopathy.   Neurological: He is alert and oriented to person, place, and time.   Skin: Skin is warm and dry. No rash noted.         Assessment:       1. Coronary artery disease of native artery of native heart with stable angina pectoris    2. Essential hypertension    3. Dyslipidemia, baseline     4. Angina pectoris, onset 2019    5. Bradycardia, drug induced    6. Coronary artery disease involving native coronary artery of native heart without angina pectoris    7. S/P drug eluting coronary stent placement         Plan:       Basim was seen today for follow-up.    Diagnoses and all orders for this visit:    Coronary artery disease of native artery of native heart with stable angina pectoris  -     IN OFFICE EKG 12-LEAD (to Muse)  -     clopidogreL (PLAVIX) 75 mg tablet; Take 1 tablet (75 mg total) by mouth " once daily.  Dispense: 90 tablet; Refill: 3  -     atorvastatin (LIPITOR) 40 MG tablet; Take 1 tablet (40 mg total) by mouth every evening.  Dispense: 90 tablet; Refill: 3  -     ramipriL (ALTACE) 10 MG capsule; Take 1 capsule (10 mg total) by mouth once daily. Take one by mouth daily for 90 days  Dispense: 90 capsule; Refill: 3    Essential hypertension  -     ramipriL (ALTACE) 10 MG capsule; Take 1 capsule (10 mg total) by mouth once daily. Take one by mouth daily for 90 days  Dispense: 90 capsule; Refill: 3    Dyslipidemia, baseline   -     atorvastatin (LIPITOR) 40 MG tablet; Take 1 tablet (40 mg total) by mouth every evening.  Dispense: 90 tablet; Refill: 3    Angina pectoris, onset 2019  -     carvediloL (COREG) 6.25 MG tablet; Take 1 tablet (6.25 mg total) by mouth 2 (two) times daily.  Dispense: 180 tablet; Refill: 3    Bradycardia, drug induced    Coronary artery disease involving native coronary artery of native heart without angina pectoris    S/P drug eluting coronary stent placement  -     clopidogreL (PLAVIX) 75 mg tablet; Take 1 tablet (75 mg total) by mouth once daily.  Dispense: 90 tablet; Refill: 3  -     carvediloL (COREG) 6.25 MG tablet; Take 1 tablet (6.25 mg total) by mouth 2 (two) times daily.  Dispense: 180 tablet; Refill: 3      - All medical issues reviewed, continue current Rx.   - Warning signs of MI and stroke given, would chew 2-4 low-dose ASA (81 mg) if symptom last more than 5 minutes and call 911.  - Avoid use of NSAID if possible, Tylenol is safer. Can use up to 3000 mg per day, use early with onset of even mild pains.  - CV status reviewed, patient acknowledge good understanding.  - Recommend healthy living: moderate alcohol, healthy diet and regular exercise aiming for fitness, restorative sleep and weight control   - Discussed healthy daily limit of 1 oz of pure alcohol in any 24 hours (roughly 2 12-oz beers, 10 oz of wine (8%-12% alcohol), or 1.5 oz of liquor (80 proof)),  can not save up.   - Instruction for Mediterranean diet and heart healthy exercise given.  - Check home blood pressure, 2 days weekly, do 2 readings within 5 minutes in AM and PM, keep log for review. Target resting BP is less than 130/80.   - Highly recommend 30-60 minutes of exercise / activities daily, can have Sunday off, with 2-3 sessions of muscle strengthening weekly. A  would be very helpful.  - Recommend at least annual cardiovascular evaluation in view of patient's significant risk factors.      Patient Active Problem List   Diagnosis    Coronary artery disease involving native coronary artery of native heart, 3 YOVANY 2/2/2016    Essential hypertension    Dyslipidemia, baseline     Chronic back pain    Anemia    LALI (generalized anxiety disorder)    Benign non-nodular prostatic hyperplasia with lower urinary tract symptoms    Preventative health care    Angina pectoris, onset 2019    Elevated PSA, between 10 and less than 20 ng/ml    Thumb joint locking    Squamous blepharitis of right upper eyelid    Bradycardia, drug induced    S/P drug eluting coronary stent placement     Total time spend including review of record prior to face-to-face visit is 40 minutes. Greater than 50% of the time was spent in counseling and coordination of care. The above assessment and plan have been discussed at length. Referring provider's note reviewed. Labs and procedure over the last 6 months reviewed. Problem List updated. Asked to bring in all active medications / pills bottles with next visit. Will send note to referring / PCP.

## 2023-10-12 NOTE — LETTER
October 12, 2023      Wally Naranjo MD  [address]  [place] [address]           [place] - Cardiology  [address]  [place] [address]  Phone: [phone]  Fax: [phone]          Patient: Basim Menendez   MR Number: [id number]   YOB: 1955   Date of Visit: 10/12/2023       Dear No ref. provider found:    Thank you for referring Basim Menendez to me for evaluation. Attached you will find relevant portions of my assessment and plan of care.    If you have questions, please do not hesitate to call me. I look forward to following Basim Menendez along with you.    Sincerely,    Azael Dueñas MD    Enclosure  CC:    No Recipients    If you would like to receive this communication electronically, please contact [email or web address] or [phone] to request more information on [facility] access.    For providers and/or their staff who would like to refer a patient to [facility], please contact us through our one-stop-shop provider referral line, [facility], at [phone].    If you feel you have received this communication in error or would no longer like to receive these types of communications, please e-mail [email or web address]

## 2023-11-15 RX ORDER — ERYTHROMYCIN 5 MG/G
OINTMENT OPHTHALMIC EVERY 6 HOURS
Qty: 3.5 G | Refills: 0 | Status: SHIPPED | OUTPATIENT
Start: 2023-11-15 | End: 2023-11-22

## 2023-11-17 ENCOUNTER — PATIENT MESSAGE (OUTPATIENT)
Dept: FAMILY MEDICINE | Facility: CLINIC | Age: 68
End: 2023-11-17
Payer: COMMERCIAL

## 2023-12-18 PROBLEM — Z00.00 PREVENTATIVE HEALTH CARE: Status: RESOLVED | Noted: 2019-04-25 | Resolved: 2023-12-18

## 2023-12-27 DIAGNOSIS — E78.5 DYSLIPIDEMIA: Chronic | ICD-10-CM

## 2023-12-27 DIAGNOSIS — I20.9 ANGINA PECTORIS: ICD-10-CM

## 2023-12-27 DIAGNOSIS — Z95.5 S/P DRUG ELUTING CORONARY STENT PLACEMENT: ICD-10-CM

## 2023-12-27 DIAGNOSIS — I10 ESSENTIAL HYPERTENSION: Chronic | ICD-10-CM

## 2023-12-27 DIAGNOSIS — I25.118 CORONARY ARTERY DISEASE OF NATIVE ARTERY OF NATIVE HEART WITH STABLE ANGINA PECTORIS: ICD-10-CM

## 2023-12-27 RX ORDER — SILDENAFIL 100 MG/1
100 TABLET, FILM COATED ORAL DAILY PRN
Qty: 10 TABLET | Refills: 11 | Status: SHIPPED | OUTPATIENT
Start: 2023-12-27 | End: 2024-01-11 | Stop reason: SDUPTHER

## 2023-12-27 RX ORDER — ATORVASTATIN CALCIUM 40 MG/1
40 TABLET, FILM COATED ORAL NIGHTLY
Qty: 90 TABLET | Refills: 3 | Status: SHIPPED | OUTPATIENT
Start: 2023-12-27

## 2023-12-27 RX ORDER — RAMIPRIL 10 MG/1
10 CAPSULE ORAL DAILY
Qty: 90 CAPSULE | Refills: 3 | Status: SHIPPED | OUTPATIENT
Start: 2023-12-27 | End: 2024-12-26

## 2023-12-27 RX ORDER — SILDENAFIL 100 MG/1
100 TABLET, FILM COATED ORAL DAILY PRN
COMMUNITY
End: 2023-12-27 | Stop reason: SDUPTHER

## 2023-12-27 RX ORDER — CLOPIDOGREL BISULFATE 75 MG/1
75 TABLET ORAL DAILY
Qty: 90 TABLET | Refills: 3 | Status: SHIPPED | OUTPATIENT
Start: 2023-12-27 | End: 2024-12-26

## 2023-12-27 RX ORDER — CARVEDILOL 6.25 MG/1
6.25 TABLET ORAL 2 TIMES DAILY
Qty: 180 TABLET | Refills: 3 | Status: SHIPPED | OUTPATIENT
Start: 2023-12-27 | End: 2024-12-26

## 2024-01-11 RX ORDER — SILDENAFIL 100 MG/1
100 TABLET, FILM COATED ORAL DAILY PRN
Qty: 10 TABLET | Refills: 11 | Status: SHIPPED | OUTPATIENT
Start: 2024-01-11 | End: 2025-01-10

## 2024-04-23 ENCOUNTER — TELEPHONE (OUTPATIENT)
Dept: FAMILY MEDICINE | Facility: CLINIC | Age: 69
End: 2024-04-23
Payer: COMMERCIAL

## 2024-04-23 NOTE — TELEPHONE ENCOUNTER
----- Message from Olivia Sandhu sent at 4/23/2024 12:00 PM CDT -----  Contact: PT  Type:  Patient Returning Call    Who Called:  PT  Who Left Message for Patient: not sure  Does the patient know what this is regarding?:  yes  Best Call Back Number:  [phone]  Additional Information:   PT states she is okay with 3:30 appt tomorrow

## 2024-04-24 ENCOUNTER — OFFICE VISIT (OUTPATIENT)
Dept: FAMILY MEDICINE | Facility: CLINIC | Age: 69
End: 2024-04-24
Payer: MEDICARE

## 2024-04-24 VITALS
HEIGHT: 70 IN | BODY MASS INDEX: 25.01 KG/M2 | RESPIRATION RATE: 18 BRPM | DIASTOLIC BLOOD PRESSURE: 70 MMHG | WEIGHT: 174.69 LBS | HEART RATE: 66 BPM | SYSTOLIC BLOOD PRESSURE: 130 MMHG | OXYGEN SATURATION: 99 %

## 2024-04-24 DIAGNOSIS — I10 PRIMARY HYPERTENSION: ICD-10-CM

## 2024-04-24 DIAGNOSIS — N40.0 BENIGN PROSTATIC HYPERPLASIA WITHOUT LOWER URINARY TRACT SYMPTOMS: ICD-10-CM

## 2024-04-24 DIAGNOSIS — E78.2 MIXED HYPERLIPIDEMIA: ICD-10-CM

## 2024-04-24 DIAGNOSIS — M79.604 PAIN OF RIGHT LOWER EXTREMITY: Primary | ICD-10-CM

## 2024-04-24 DIAGNOSIS — I10 ESSENTIAL HYPERTENSION, BENIGN: ICD-10-CM

## 2024-04-24 DIAGNOSIS — S86.811A STRAIN OF CALF MUSCLE, RIGHT, INITIAL ENCOUNTER: ICD-10-CM

## 2024-04-24 PROBLEM — S86.819A STRAIN OF CALF MUSCLE: Status: ACTIVE | Noted: 2024-04-24

## 2024-04-24 PROCEDURE — 96372 THER/PROPH/DIAG INJ SC/IM: CPT | Mod: S$GLB,,, | Performed by: INTERNAL MEDICINE

## 2024-04-24 PROCEDURE — 99214 OFFICE O/P EST MOD 30 MIN: CPT | Mod: 25,S$GLB,, | Performed by: INTERNAL MEDICINE

## 2024-04-24 RX ORDER — KETOROLAC TROMETHAMINE 30 MG/ML
30 INJECTION, SOLUTION INTRAMUSCULAR; INTRAVENOUS ONCE
Status: COMPLETED | OUTPATIENT
Start: 2024-04-24 | End: 2024-04-24

## 2024-04-24 RX ADMIN — KETOROLAC TROMETHAMINE 30 MG: 30 INJECTION, SOLUTION INTRAMUSCULAR; INTRAVENOUS at 04:04

## 2024-04-25 NOTE — PROGRESS NOTES
Subjective:       Patient ID: Basim Menendez is a 69 y.o. male.    Chief Complaint: Muscle Pain (Strain right muscle)      Patient is established already .......... presents today to discuss R calf pain at [employer] 2 days ago    Muscle Pain  This is a new problem. The current episode started in the past 7 days. The problem occurs intermittently. The problem has been gradually improving. Associated symptoms include myalgias. Pertinent negatives include no fever. The symptoms are aggravated by twisting, walking, standing and exertion. He has tried acetaminophen, ice, rest, relaxation, lying down and immobilization for the symptoms. The treatment provided moderate relief.     Review of Systems   Constitutional:  Negative for activity change, appetite change and fever.   Respiratory: Negative.     Cardiovascular: Negative.    Gastrointestinal: Negative.    Musculoskeletal:  Positive for leg pain and myalgias.         Objective:      Physical Exam  Vitals and nursing note reviewed.   Constitutional:       Appearance: Normal appearance. He is obese.   Cardiovascular:      Rate and Rhythm: Normal rate and regular rhythm.      Pulses: Normal pulses.      Heart sounds: Normal heart sounds. No murmur heard.     No friction rub. No gallop.   Pulmonary:      Effort: Pulmonary effort is normal.      Breath sounds: Normal breath sounds. No wheezing.   Musculoskeletal:      Comments: Slight diameter increase of R calf compared to the left calf   Skin:     General: Skin is warm and dry.   Neurological:      Mental Status: He is alert.   Psychiatric:         Mood and Affect: Mood normal.         Assessment:       1. Pain of right lower extremity  Overview:  Started 2 days ago after a twisting move at [employer]    Assessment & Plan:  Sprained R calf muscle    Orders:  -     ketorolac injection 30 mg    2. Essential hypertension, benign  -     Microalbumin/Creatinine Ratio, Urine; Future; Expected date: 04/24/2024  -      Comprehensive Metabolic Panel; Future; Expected date: 04/24/2024    3. Mixed hyperlipidemia  -     Lipid Panel; Future; Expected date: 04/24/2024    4. Benign prostatic hyperplasia without lower urinary tract symptoms  -     Prostate Specific Antigen, Diagnostic; Future; Expected date: 04/24/2024    5. Strain of calf muscle, right, initial encounter  Overview:  After twisting movement at [employer] 2 days ago    Assessment & Plan:  Ice, elevation  Rest, advil or aleve prn      6. Primary hypertension  Overview:  At goal    Assessment & Plan:  Get renal fx, lipids & BS             Plan:       1. Pain of right lower extremity  Overview:  Started 2 days ago after a twisting move at [employer]    Assessment & Plan:  Sprained R calf muscle    Orders:  -     ketorolac injection 30 mg    2. Essential hypertension, benign  -     Microalbumin/Creatinine Ratio, Urine; Future; Expected date: 04/24/2024  -     Comprehensive Metabolic Panel; Future; Expected date: 04/24/2024    3. Mixed hyperlipidemia  -     Lipid Panel; Future; Expected date: 04/24/2024    4. Benign prostatic hyperplasia without lower urinary tract symptoms  -     Prostate Specific Antigen, Diagnostic; Future; Expected date: 04/24/2024    5. Strain of calf muscle, right, initial encounter  Overview:  After twisting movement at [employer] 2 days ago    Assessment & Plan:  Ice, elevation  Rest, advil or aleve prn      6. Primary hypertension  Overview:  At goal    Assessment & Plan:  Get renal fx, lipids & BS

## 2024-05-03 DIAGNOSIS — I20.9 ANGINA PECTORIS: ICD-10-CM

## 2024-05-03 DIAGNOSIS — Z95.5 S/P DRUG ELUTING CORONARY STENT PLACEMENT: ICD-10-CM

## 2024-05-03 DIAGNOSIS — I25.118 CORONARY ARTERY DISEASE OF NATIVE ARTERY OF NATIVE HEART WITH STABLE ANGINA PECTORIS: ICD-10-CM

## 2024-05-03 DIAGNOSIS — I10 ESSENTIAL HYPERTENSION: Chronic | ICD-10-CM

## 2024-05-03 RX ORDER — RAMIPRIL 10 MG/1
10 CAPSULE ORAL DAILY
Qty: 90 CAPSULE | Refills: 3 | Status: SHIPPED | OUTPATIENT
Start: 2024-05-03 | End: 2025-05-03

## 2024-05-03 RX ORDER — CARVEDILOL 6.25 MG/1
6.25 TABLET ORAL 2 TIMES DAILY
Qty: 180 TABLET | Refills: 3 | Status: SHIPPED | OUTPATIENT
Start: 2024-05-03 | End: 2025-05-03

## 2024-05-06 RX ORDER — AMOXICILLIN AND CLAVULANATE POTASSIUM 875; 125 MG/1; MG/1
1 TABLET, FILM COATED ORAL 2 TIMES DAILY
Qty: 10 TABLET | Refills: 0 | Status: SHIPPED | OUTPATIENT
Start: 2024-05-06 | End: 2024-05-11

## 2024-09-26 DIAGNOSIS — Z95.5 S/P DRUG ELUTING CORONARY STENT PLACEMENT: ICD-10-CM

## 2024-09-26 DIAGNOSIS — E78.5 DYSLIPIDEMIA: Chronic | ICD-10-CM

## 2024-09-26 DIAGNOSIS — I25.118 CORONARY ARTERY DISEASE OF NATIVE ARTERY OF NATIVE HEART WITH STABLE ANGINA PECTORIS: ICD-10-CM

## 2024-09-26 RX ORDER — ATORVASTATIN CALCIUM 40 MG/1
40 TABLET, FILM COATED ORAL NIGHTLY
Qty: 90 TABLET | Refills: 0 | Status: SHIPPED | OUTPATIENT
Start: 2024-09-26 | End: 2024-12-25

## 2024-09-26 RX ORDER — CLOPIDOGREL BISULFATE 75 MG/1
75 TABLET ORAL DAILY
Qty: 90 TABLET | Refills: 0 | Status: SHIPPED | OUTPATIENT
Start: 2024-09-26 | End: 2024-12-25

## 2024-11-01 ENCOUNTER — TELEPHONE (OUTPATIENT)
Dept: FAMILY MEDICINE | Facility: CLINIC | Age: 69
End: 2024-11-01
Payer: MEDICARE

## 2024-11-08 ENCOUNTER — OFFICE VISIT (OUTPATIENT)
Dept: FAMILY MEDICINE | Facility: CLINIC | Age: 69
End: 2024-11-08
Payer: MEDICARE

## 2024-11-08 VITALS — HEIGHT: 70 IN | WEIGHT: 163.88 LBS | BODY MASS INDEX: 23.46 KG/M2 | OXYGEN SATURATION: 97 %

## 2024-11-08 DIAGNOSIS — Z12.12 SCREENING FOR MALIGNANT NEOPLASM OF THE RECTUM: ICD-10-CM

## 2024-11-08 DIAGNOSIS — K40.90 DIRECT INGUINAL HERNIA: ICD-10-CM

## 2024-11-08 DIAGNOSIS — J06.9 UPPER RESPIRATORY TRACT INFECTION, UNSPECIFIED TYPE: Primary | ICD-10-CM

## 2024-11-08 RX ORDER — BENZONATATE 200 MG/1
200 CAPSULE ORAL 3 TIMES DAILY PRN
Qty: 30 CAPSULE | Refills: 0 | Status: SHIPPED | OUTPATIENT
Start: 2024-11-08 | End: 2024-11-18

## 2024-11-08 RX ORDER — AZITHROMYCIN 250 MG/1
TABLET, FILM COATED ORAL
Qty: 6 TABLET | Refills: 0 | Status: SHIPPED | OUTPATIENT
Start: 2024-11-08 | End: 2024-11-13

## 2024-11-08 RX ORDER — CEFTRIAXONE 500 MG/1
500 INJECTION, POWDER, FOR SOLUTION INTRAMUSCULAR; INTRAVENOUS
Status: COMPLETED | OUTPATIENT
Start: 2024-11-08 | End: 2024-11-08

## 2024-11-08 RX ADMIN — CEFTRIAXONE 500 MG: 500 INJECTION, POWDER, FOR SOLUTION INTRAMUSCULAR; INTRAVENOUS at 10:11

## 2024-11-13 RX ORDER — BENZONATATE 200 MG/1
200 CAPSULE ORAL 3 TIMES DAILY PRN
Qty: 30 CAPSULE | Refills: 0 | Status: SHIPPED | OUTPATIENT
Start: 2024-11-13 | End: 2024-11-23

## 2024-11-18 ENCOUNTER — OFFICE VISIT (OUTPATIENT)
Dept: FAMILY MEDICINE | Facility: CLINIC | Age: 69
End: 2024-11-18
Payer: MEDICARE

## 2024-11-18 VITALS
BODY MASS INDEX: 23.62 KG/M2 | DIASTOLIC BLOOD PRESSURE: 74 MMHG | WEIGHT: 165 LBS | HEART RATE: 70 BPM | SYSTOLIC BLOOD PRESSURE: 122 MMHG | HEIGHT: 70 IN | OXYGEN SATURATION: 98 %

## 2024-11-18 DIAGNOSIS — Z00.00 ENCOUNTER FOR ANNUAL WELLNESS VISIT (AWV) IN MEDICARE PATIENT: ICD-10-CM

## 2024-11-18 DIAGNOSIS — I10 ESSENTIAL HYPERTENSION, BENIGN: ICD-10-CM

## 2024-11-18 DIAGNOSIS — E78.2 MIXED HYPERLIPIDEMIA: ICD-10-CM

## 2024-11-18 DIAGNOSIS — Z23 NEED FOR PROPHYLACTIC VACCINATION WITH TETANUS-DIPHTHERIA (TD): ICD-10-CM

## 2024-11-18 DIAGNOSIS — J06.9 VIRAL UPPER RESPIRATORY TRACT INFECTION: ICD-10-CM

## 2024-11-18 DIAGNOSIS — N40.0 BENIGN PROSTATIC HYPERPLASIA WITHOUT LOWER URINARY TRACT SYMPTOMS: Primary | ICD-10-CM

## 2024-11-18 PROCEDURE — G0008 ADMIN INFLUENZA VIRUS VAC: HCPCS | Mod: S$GLB,,, | Performed by: INTERNAL MEDICINE

## 2024-11-18 PROCEDURE — 3078F DIAST BP <80 MM HG: CPT | Mod: CPTII,S$GLB,, | Performed by: INTERNAL MEDICINE

## 2024-11-18 PROCEDURE — 90653 IIV ADJUVANT VACCINE IM: CPT | Mod: S$GLB,,, | Performed by: INTERNAL MEDICINE

## 2024-11-18 PROCEDURE — 99212 OFFICE O/P EST SF 10 MIN: CPT | Mod: 25,S$GLB,, | Performed by: INTERNAL MEDICINE

## 2024-11-18 PROCEDURE — 1101F PT FALLS ASSESS-DOCD LE1/YR: CPT | Mod: CPTII,S$GLB,, | Performed by: INTERNAL MEDICINE

## 2024-11-18 PROCEDURE — 3008F BODY MASS INDEX DOCD: CPT | Mod: CPTII,S$GLB,, | Performed by: INTERNAL MEDICINE

## 2024-11-18 PROCEDURE — 4010F ACE/ARB THERAPY RXD/TAKEN: CPT | Mod: CPTII,S$GLB,, | Performed by: INTERNAL MEDICINE

## 2024-11-18 PROCEDURE — 1160F RVW MEDS BY RX/DR IN RCRD: CPT | Mod: CPTII,S$GLB,, | Performed by: INTERNAL MEDICINE

## 2024-11-18 PROCEDURE — 3074F SYST BP LT 130 MM HG: CPT | Mod: CPTII,S$GLB,, | Performed by: INTERNAL MEDICINE

## 2024-11-18 PROCEDURE — 3288F FALL RISK ASSESSMENT DOCD: CPT | Mod: CPTII,S$GLB,, | Performed by: INTERNAL MEDICINE

## 2024-11-18 PROCEDURE — 1170F FXNL STATUS ASSESSED: CPT | Mod: CPTII,S$GLB,, | Performed by: INTERNAL MEDICINE

## 2024-11-18 PROCEDURE — 99497 ADVNCD CARE PLAN 30 MIN: CPT | Mod: S$GLB,,, | Performed by: INTERNAL MEDICINE

## 2024-11-18 PROCEDURE — G0439 PPPS, SUBSEQ VISIT: HCPCS | Mod: S$GLB,,, | Performed by: INTERNAL MEDICINE

## 2024-11-18 PROCEDURE — 1126F AMNT PAIN NOTED NONE PRSNT: CPT | Mod: CPTII,S$GLB,, | Performed by: INTERNAL MEDICINE

## 2024-11-18 PROCEDURE — 1158F ADVNC CARE PLAN TLK DOCD: CPT | Mod: CPTII,S$GLB,, | Performed by: INTERNAL MEDICINE

## 2024-11-18 PROCEDURE — 1159F MED LIST DOCD IN RCRD: CPT | Mod: CPTII,S$GLB,, | Performed by: INTERNAL MEDICINE

## 2024-11-18 NOTE — PROGRESS NOTES
Subjective:       Patient ID: Basim Menendez is a 69 y.o. male.    Chief Complaint: Medication Refill (Patient is here for a medication refill. ) and Annual Exam      History of Present Illness    CHIEF COMPLAINT:  Basim presents for a follow-up visit regarding recent congestion and to discuss routine health maintenance.    HPI:  Basim reports improvement in overall condition but continued chest congestion. He has been using previously obtained cough drops and various OTC medications to manage symptoms. Basim took Sudafed twice yesterday but abstained today due to concerns about its effects on blood pressure. He has also been using a nasal rinse with saline solution. Basim mentions a recent appointment with his cardiologist, Dr. Dueñas, on New Year's Olivia, indicating ongoing cardiac care.    Basim denies having adverse reactions to previous influenza vaccinations.    MEDICATIONS:  Basim is on Lipitor with a prescription ending on [place] day. He is also on Plavix and Carvedilol.    MEDICAL HISTORY:  Basim has a history of hyperlipidemia and a cardiovascular condition. He received the COVID-19 vaccine (Pfizer) approximately 2 months ago in September.    TEST RESULTS:  Basim had labs done over a year ago, and the results were good.         Review of Systems    Review of system:  Patient reports no dry skin, no itching, no abnormal moles, no jaundice, no rashes, no hives, no discoloration, no excessive facial or body hair between bracket hirsutism, no hair thinning, no growth/lesions, and no excessive sweating; patient reports no fever, no chills, no night sweats, no significant weight gain, no significant weight loss, no exercise intolerance  Patient reports normal appetite and no sleep disturbances (insomnia)  Patient reports no dry eyes, no irritation, no pain in the eyes, no visual changes, no floaters, no sensitivity to light between bracket photophobia, no seeing double between bracket diplopia, and  No  discharge.  Patient reports no difficulty hearing, no ear pain, no vertigo and no ringing in the ears between bracket tinnitus.  Patient reports no difficulty smelling, no frequent nosebleeds, and no nose/sinus  Problems.  Patient reports no dry mouth, no unusual taste of foods, no mouth ulcers, no bleeding gums, and oral abnormalities and no teeth problem  Patient reports no sore throat, no difficulty swallowing between bracket dysphagia, no anterior neck pain/tenderness, no snoring no change in voice.  Patient reports no chest pain, no arm pain on exertion, no shortness of breath when walking, no shortness of breath when lying down, no palpitations, no known heart murmur, no lightheadedness, no calf or jaw pains, and no ankle edema.  Patient reports no cough, no nasal congestion, no runny nose, no sinus pressure, no wheezing, no frequent sneezing, no shortness of breath, no rapid breathing, no sputum production and no coughing up blood  Patient reports no nausea, no vomiting, no vomiting blood, no abdominal pain, normal appetite, no diarrhea, no constipation, no dyspepsia/reflux, no heartburn, no early satiety, complete emptying of stool cup, no bloating, able to restrain bowel movements, no bowel urgency and no blood in stools/on toilet paper.  Patient reports no pain during urination, no incontinence, no difficulty urinating, no hematuria, no increased frequency, no feelings of urgency, no flank pains and no urinary tract infections  Patient reports no muscle aches, no muscle weakness, no muscle cramps, no arthralgias/joint pain, no back pains, no swelling in the extremities and no difficulty walking.  Patient reports in dependency.  Patient reports no loss of consciousness, no slurred speech, no weakness, no numbness, no tingling, no tremors, no seizures, no dizziness, no headaches, no memory lapses or changes, no difficulty finding desired words, no loss of balance or falls  Patient reports no irritability, no  depression, no anxiety, no panic attacks, no sleep disturbances, feeling safe in her relationship, no paranoia and no thoughts about suicide  Patient reports no fatigue, no cold intolerance, no heat intolerance and no unusual body odor  Patient reports no bruising, no swollen glands, no clotting problems, and no bleeding disorders    Review for Opioid Screening: Pt does not have Rx for Opioids (If patient has Rx list here)      Review for Substance Use Disorders: Patient does not use substance (If patient has Rx list here)       Objective:      Physical Exam  Physical Exam:  Patient is a  year old   Constitutional:  General appearance:  Healthy appearing, well nourished, well developed, and well groomed.  Level of distress:  NAD.  Ambulation:  Ambulating normally.  Psychiatric:  Insight:  Good judgment.  Mental status:  Normal mood and affect an active and alert.  Orientation:  To time, place and person.  Memory:  Recent memory  Normal and remote memory normal.  Head:  Normocephalic, atraumatic, symmetrical, no tenderness, and hair is evenly distributed.  Eyes:  Lids and conjunctivae:  No discharge, pallor or redness and noninjected.  Pupils:  Kept it PERRLA.  Corneas: Grossly intact and fluorescein stain normal.  Funduscopic examination:  Normal vessels and optic discs, no exudates or hemorrhages and grossly normal except where noted.  Capital E OM: Intact.  Lungs: Clear.  Sclera: Nonicteric.  Vision: Peripheral vision grossly intact in acuity grossly intact.  Optic disc:  Normal appearance and vessels and no exudates or hemorrhages  ENMT:  Ears:  No lesions on external ear, EACs clear.  TMs clear.  TM mobility normal and normal general appearance.  Hearing:  No hearing loss and Rinne: AC > BC.  Nose:  No polyps, lesions on external nose, septal deviation, sinus tenderness, or nasal discharge; nasal passages clear mucosa pink; and nares patent.  Lips teeth and gums: No mouth or lip ulcers or bleeding.  Gums are  normal dentition normal.  Oropharynx:  No erythema exudates or ulcers and moist mucous membranes.  Tonsils not enlarged.  Oral mucosa and salivary glands normal  Neck:  Neck is supple, symmetrical, trachea midline and no masses.  Lymph nodes:  No cervical lymphadenopathy.  Thyroid no enlargement or nodules and nontender  Lungs:  Respiratory effort no dyspnea.  Percussion: No dullness, flatness or hyper-resonance.  Auscultation:  No wheezing, rales/crackles, no rhonchi and breath sounds normal, good air movement, and clear to auscultation except as noted.  Chest deformity: normal thoracic no deformities  Cardiovascular:  Apical pulse: Nondisplaced.  Heart auscultation:  Normal S1 and S2; no murmurs rubs or gallops; and RRR.  Neck vessels: No carotid bruit on the right or left and no JVDs or hepatojugular reflux.  Arterial pulses normal on the right and left radial femoral and dorsalis pedis and posterior tibial.  Varicosities right and left non-existent and capillary refill test normal.  Edema none: on the right or left  Breast: not applicable  Abdomen:  Bowel sounds normal.  Inspection and palpation:  No tenderness guarding masses rebound tenderness or CVA tenderness and soft and nondistended.  Liver column nontender and no hepatomegaly.  Spleen:  Nontender and no splenomegaly.  Hernia: Nonpalpable  Male :  Deferred  Rectal:  No hemorrhoids fissures masses and normal tone and stool heme-negative  Musculoskeletal:  Motor strength and tone:  Normal and normal tone.  Joints bones and muscles:  No contractures malalignment, tenderness or bony abnormalities and normal movement of all extremities and posture is normal.  Extremities:  No cyanosis clubbing or palpable cords  Neurological examination: Gait and station:  Normal gait and station.  Cranial nerves:  Grossly intact.  Sensation:  Monofilament test intact and normal and grossly intact.  Reflexes:  DTRs 2+ bilaterally throughout.  Coordination and cerebellum: no  intention tremors, no resting tremors. Romberg's sign: negative. Motor strength normal on the right and left.  Sensation normal on the right and left side.  No pain/temperature decrease on the legs and dorsum of the feet.  No tactile decreased on the leg and dorsum of the feet.  No vibration- perception threshold decrease  Skin:  Inspection and palpation: No rash, lesions, ulcers, nodules, jaundice or abnormal nevi and good turgor.  Nails:  Normal.  Right and left lower extremities column normal  Back: Thoracolumbar appearance: Normal curvature  Foot examination:  Right and left feet were examined, and toes were examined:  No deformity, inter digital erythema, or nail changes or disorders and digital hair present and normal motion.     I offered to discuss end of life issues, including information on how to make advance directives that the patient could use to name someone who would make medical decisions on their behalf if they became too ill to make themselves.      __Patient declined       _X__Patient is interested, I provided paperwork and offered to discuss       Assessment:       1. Benign prostatic hyperplasia without lower urinary tract symptoms  -     Prostate Specific Antigen, Diagnostic; Future; Expected date: 11/18/2024    2. Need for prophylactic vaccination with tetanus-diphtheria (Td)  -     influenza (adjuvanted) (Fluad) 45 mcg/0.5 mL IM vaccine (> or = 64 yo) 0.5 mL    3. Essential hypertension, benign  -     Comprehensive Metabolic Panel; Future; Expected date: 11/18/2024  -     Microalbumin/Creatinine Ratio, Urine; Future; Expected date: 11/18/2024    4. Mixed hyperlipidemia  -     Lipid Panel; Future; Expected date: 11/18/2024    5. Encounter for annual wellness visit (AWV) in Medicare patient  Overview:  AWV    Assessment & Plan:  I gave the patient written recommendations re the outstanding vaccinations with the AVS  Diet , wt loss , exercise d/w patient  I gave the patient a copy of the  AMD  We discussed tobacco, ETOH & physical activity  Dep screen : neg  Get PSA  Mail fitkit        6. Viral upper respiratory tract infection  Overview:  No f/c  + cough , congestion for few days  No sore throat/ swollen glands      Assessment & Plan:  Increase your intake of fluids and stay hydrated  Use over-the-counter cold and sinus medicine for example DayQuil for daytime ,NyQuil for nighttime  Please add Tylenol , Aleve or Advil as needed for low-grade temperatures and soreness  Get enough rest  No need for treatment with antibiotics at the current time as your symptoms and lack of temp point to a viral or allergic disease  Please call us back or return if fever develops or symptoms progress               Plan:       Assessment & Plan    Assessed patient's improving condition from recent illness, noting residual congestion  Considered patient's upcoming travel plans when discussing vaccinations  Evaluated patient's medication regimen, noting need for cholesterol medication refill  Reviewed patient's vaccination status, identifying need for RSV and pneumonia vaccines    UPPER RESPIRATORY INFECTION:  - Explained ineffectiveness of Sudafed for congestion relief and its potential side effects, including increased heart rate, urinary retention, and elevated blood pressure.  - Discussed that steroid nasal sprays are preferable to OTC decongestants for upper respiratory symptoms.  - Basim to continue using saline nasal rinse for congestion relief.    HYPERTENSION:  - Continued Lipitor (cholesterol medication), Plavix, and Carvedilol.  - Ordered routine labs.  - Follow up tomorrow morning after 8 AM for labs.  - Contact the office via text a few days before Samira to remind about sending new Lipitor prescription.    IMMUNIZATION:  - Administered flu vaccine in office.    COLON CANCER SCREENING:  - Complete and mail provided colonoscopy screening kit before the end of the year.    GENERAL HEALTH MANAGEMENT:  -  Discussed the importance of Advanced Medical Directives as required by Medicare for patients over 65.       Basim was seen today for medication refill and annual exam.    Diagnoses and all orders for this visit:    Benign prostatic hyperplasia without lower urinary tract symptoms  -     Prostate Specific Antigen, Diagnostic; Future    Need for prophylactic vaccination with tetanus-diphtheria (Td)  -     influenza (adjuvanted) (Fluad) 45 mcg/0.5 mL IM vaccine (> or = 66 yo) 0.5 mL    Essential hypertension, benign  -     Comprehensive Metabolic Panel; Future  -     Microalbumin/Creatinine Ratio, Urine; Future    Mixed hyperlipidemia  -     Lipid Panel; Future    Encounter for annual wellness visit (AWV) in Medicare patient    Viral upper respiratory tract infection          Advance Care Planning     Date: 11/18/2024    Power of   I initiated the process of voluntary advance care planning today and explained the importance of this process to the patient.  I introduced the concept of advance directives to the patient, as well. Then the patient received detailed information about the importance of designating a Health Care Power of  (HCPOA). He was also instructed to communicate with this person about their wishes for future healthcare, should he become sick and lose decision-making capacity. The patient has not previously appointed a HCPOA. After our discussion, the patient has decided to complete a HCPOA and has appointed his son, health care agent:  Tyler Menendez  & health care agent number:  See demo . I encouraged him to communicate with this person about their wishes for future healthcare, should he become sick and lose decision-making capacity.      A total of 16 min was spent on advance care planning, goals of care discussion, emotional support, formulating and communicating prognosis and exploring burden/benefit of various approaches of treatment. This discussion occurred on a fully voluntary  basis with the verbal consent of the patient and/or family.

## 2024-11-18 NOTE — ASSESSMENT & PLAN NOTE
I gave the patient written recommendations re the outstanding vaccinations with the AVS  Diet , wt loss , exercise d/w patient  I gave the patient a copy of the AMD  We discussed tobacco, ETOH & physical activity  Dep screen : neg  Get PSA  Mail fitkit

## 2024-11-19 ENCOUNTER — OFFICE VISIT (OUTPATIENT)
Facility: CLINIC | Age: 69
End: 2024-11-19
Payer: MEDICARE

## 2024-11-19 ENCOUNTER — LAB VISIT (OUTPATIENT)
Dept: LAB | Facility: CLINIC | Age: 69
End: 2024-11-19
Payer: MEDICARE

## 2024-11-19 VITALS — WEIGHT: 167 LBS | HEIGHT: 70 IN | HEART RATE: 57 BPM | OXYGEN SATURATION: 98 % | BODY MASS INDEX: 23.91 KG/M2

## 2024-11-19 DIAGNOSIS — I10 ESSENTIAL HYPERTENSION, BENIGN: ICD-10-CM

## 2024-11-19 DIAGNOSIS — N40.0 BENIGN PROSTATIC HYPERPLASIA WITHOUT LOWER URINARY TRACT SYMPTOMS: ICD-10-CM

## 2024-11-19 DIAGNOSIS — K40.90 DIRECT INGUINAL HERNIA: ICD-10-CM

## 2024-11-19 DIAGNOSIS — K40.90 RIGHT INGUINAL HERNIA: Primary | ICD-10-CM

## 2024-11-19 DIAGNOSIS — E78.2 MIXED HYPERLIPIDEMIA: ICD-10-CM

## 2024-11-19 LAB
ALBUMIN SERPL BCP-MCNC: 3.6 G/DL (ref 3.5–5.2)
ALBUMIN/CREAT UR: NORMAL UG/MG (ref 0–30)
ALP SERPL-CCNC: 70 U/L (ref 40–150)
ALT SERPL W/O P-5'-P-CCNC: 32 U/L (ref 10–44)
ANION GAP SERPL CALC-SCNC: 7 MMOL/L (ref 8–16)
AST SERPL-CCNC: 31 U/L (ref 10–40)
BILIRUB SERPL-MCNC: 0.7 MG/DL (ref 0.1–1)
BUN SERPL-MCNC: 12 MG/DL (ref 8–23)
CALCIUM SERPL-MCNC: 9 MG/DL (ref 8.7–10.5)
CHLORIDE SERPL-SCNC: 101 MMOL/L (ref 95–110)
CHOLEST SERPL-MCNC: 139 MG/DL (ref 120–199)
CHOLEST/HDLC SERPL: 3.3 {RATIO} (ref 2–5)
CO2 SERPL-SCNC: 23 MMOL/L (ref 23–29)
COMPLEXED PSA SERPL-MCNC: 3.4 NG/ML (ref 0–4)
CREAT SERPL-MCNC: 0.8 MG/DL (ref 0.5–1.4)
CREAT UR-MCNC: 42 MG/DL (ref 23–375)
EST. GFR  (NO RACE VARIABLE): >60 ML/MIN/1.73 M^2
GLUCOSE SERPL-MCNC: 107 MG/DL (ref 70–110)
HDLC SERPL-MCNC: 42 MG/DL (ref 40–75)
HDLC SERPL: 30.2 % (ref 20–50)
LDLC SERPL CALC-MCNC: 83.2 MG/DL (ref 63–159)
MICROALBUMIN UR DL<=1MG/L-MCNC: <5 UG/ML
NONHDLC SERPL-MCNC: 97 MG/DL
POTASSIUM SERPL-SCNC: 4.5 MMOL/L (ref 3.5–5.1)
PROT SERPL-MCNC: 6.3 G/DL (ref 6–8.4)
SODIUM SERPL-SCNC: 131 MMOL/L (ref 136–145)
TRIGL SERPL-MCNC: 69 MG/DL (ref 30–150)

## 2024-11-19 PROCEDURE — 1101F PT FALLS ASSESS-DOCD LE1/YR: CPT | Mod: CPTII,S$GLB,, | Performed by: SPECIALIST

## 2024-11-19 PROCEDURE — 3288F FALL RISK ASSESSMENT DOCD: CPT | Mod: CPTII,S$GLB,, | Performed by: SPECIALIST

## 2024-11-19 PROCEDURE — 36415 COLL VENOUS BLD VENIPUNCTURE: CPT | Mod: ,,, | Performed by: INTERNAL MEDICINE

## 2024-11-19 PROCEDURE — 80061 LIPID PANEL: CPT | Performed by: INTERNAL MEDICINE

## 2024-11-19 PROCEDURE — 1126F AMNT PAIN NOTED NONE PRSNT: CPT | Mod: CPTII,S$GLB,, | Performed by: SPECIALIST

## 2024-11-19 PROCEDURE — 1159F MED LIST DOCD IN RCRD: CPT | Mod: CPTII,S$GLB,, | Performed by: SPECIALIST

## 2024-11-19 PROCEDURE — 1160F RVW MEDS BY RX/DR IN RCRD: CPT | Mod: CPTII,S$GLB,, | Performed by: SPECIALIST

## 2024-11-19 PROCEDURE — 99203 OFFICE O/P NEW LOW 30 MIN: CPT | Mod: S$GLB,,, | Performed by: SPECIALIST

## 2024-11-19 PROCEDURE — 82043 UR ALBUMIN QUANTITATIVE: CPT | Performed by: INTERNAL MEDICINE

## 2024-11-19 PROCEDURE — 3008F BODY MASS INDEX DOCD: CPT | Mod: CPTII,S$GLB,, | Performed by: SPECIALIST

## 2024-11-19 PROCEDURE — 84153 ASSAY OF PSA TOTAL: CPT | Performed by: INTERNAL MEDICINE

## 2024-11-19 PROCEDURE — 4010F ACE/ARB THERAPY RXD/TAKEN: CPT | Mod: CPTII,S$GLB,, | Performed by: SPECIALIST

## 2024-11-19 PROCEDURE — 80053 COMPREHEN METABOLIC PANEL: CPT | Performed by: INTERNAL MEDICINE

## 2024-11-19 RX ORDER — SODIUM CHLORIDE 9 MG/ML
INJECTION, SOLUTION INTRAVENOUS CONTINUOUS
OUTPATIENT
Start: 2024-11-19

## 2024-11-19 NOTE — PATIENT INSTRUCTIONS
Pre-operative Instructions     Date of procedure:  01/08/24      Do not take the following Medications for 3 days prior to your procedure:   Aspirin, Excedrin, BC powder or goodies powders   Ibuprofen or Motrin  Naproxen or Aleve  Fish oils  Vitamin E    Pre-OP Instructions  On the night of 01/07/24 Scrub the surgical area with Hibiclens for several minutes  On the morning of 01/08/24 scrub the surgical area with Hibiclens for several minutes  Do not shave or clip hair at the site of your surgery  Do not wear jewelry to the hospital.  You will have to remove it.  Leave at home so that it is not lost.    Food/Drink   Do not eat or drink after Midnight     Location of Department:   [facility] [address], [place] [address]    Parking:    Use parking lot 1  Enter at the main hospital entrance  Check in with outpatient registration    Contact:   Someone from surgery will call you with a time of arrival.   If you surgery is on Monday, someone will contact you on Friday.  If you do not receive a call from surgery by 2pm the business day (01/07/24) before your procedure, please call [phone].     Medications:   You may take your blood pressure/thyroid/seizure medications with a small sip of water. Take all other morning medications after the procedure        Transportation:   You will NOT be able to drive yourself.  You are required to have someone drive you home from the hospital due to the anesthesia.              Post-operative Instructions      RESTRICTIONS:   During your procedure today, you received medications for sedation.     These medications may affect your judgement, balance, and coordination.     DO NOT drive a car, operate machinery, make legal/financial decisions, sign important papers or drink alcohol on day of procedure.     ACTIVITY:   After your surgery you CANNOT LIFT anything over 10 pounds, no pushing, no pulling, no bending or no strenuous exercise UNTIL  released by doctor.  Do not bath until your sutures or staples are removed.  You may take a shower. Keep surgical areas clean and dry, re-bandage areas as needed.     DIET AND MEDICATIONS    May eat and drink normally unless instructed otherwise.     Continue present medications unless otherwise instructed     TREATMENT FOR COMMON SIDE EFFECTS:   Pain medication is prescribed to be taken as needed, NOT on a schedule. Use pain medication for periods of high activity and before sleep. Providers have limits on amounts of medications that they may prescribe. Use pain medication properly to assure availability.     Because air was used during your procedure you may experience mild abdominal pain, nausea, belching, bloating or excessive gas: walking, rest, eat lightly and use a heating pad to help alleviate discomfort.     Sore throat: treat with throat lozenges and/or gargle with warm salt water.     If a bowel prep was taken, you may not have a bowel movement for 1-3 days. This is normal.     IF YOU HAVE ANY OF THE SYMPTOMS BELOW, REPORT TO YOUR PHYSICIAN:     1. Excessive or unexpected pain in back or abdomen     2. Signs of infection such as: chills or fever occurring within 24 hours after the procedure.     3. Rectal bleeding, which would show as bright red, maroon, or black stools. (A tablespoon of blood from the rectum is not serious, especially if hemorrhoids are present.)     4. Vomiting     5. Weakness or dizziness.     IF YOU EXPERIENCE ANY OF THE FOLLOWING, GO DIRECTLY TO THE NEAREST EMERGENCY ROOM:  1. Difficulty breathing     2. Chills and/or fever over 101 F     3. Persistent vomiting and/or vomiting blood     4. Severe abdominal pain     5. Chest pain     6. Black, tarry stools     7. Bleeding-more than one tablespoon     8. Any other symptoms or condition that you feel may need urgent attention.       YOUR DOCTOR RECOMMENDS THESE ADDITIONAL INSTRUCTIONS:     1. If any biopsies were taken, your results  will be discussed at your follow up appointment     2. Further recommendations will depend on how you are recovering from you procedure

## 2024-11-19 NOTE — PROGRESS NOTES
"Subjective     Patient ID: Basim Menendez  is a 69 y.o. male     Chief Complaint:   Chief Complaint   Patient presents with    Referral     Right Inguinal hernia       Vitals:    11/19/24 1341   Pulse: (!) 57   SpO2: 98%   Weight: 75.8 kg (167 lb)   Height: 5' 10" (1.778 m)       HPI     Referral     Additional comments: Right Inguinal hernia           Last edited by Nilton Dias MA on 11/19/2024  1:44 PM.      Very pleasant 69-year-old male referred in for evaluation of right inguinal hernia.  No previous abdominal surgery.  Causing minimal discomfort.  Easily reducible.  No change in bowel or bladder habits.  Past Medical History:   Diagnosis Date    Coronary artery disease     Hyperlipidemia     Hypertension     Pollen allergies 2016     Past Surgical History:   Procedure Laterality Date    ANKLE FRACTURE SURGERY      BICEPS TENDON REPAIR      CARDIAC CATHETERIZATION      CORONARY ANGIOPLASTY      KNEE ARTHROSCOPY W/ DEBRIDEMENT      SHOULDER ARTHROSCOPY      left    SINUS SURGERY      deivated septum.  Chronic infection follow injury    SKIN CANCER EXCISION  2020    x 3     Family History   Problem Relation Name Age of Onset    Stroke Mother      Diabetes Father      Heart disease Father      Heart disease Brother          rhythm problems     Past Surgical History:   Procedure Laterality Date    ANKLE FRACTURE SURGERY      BICEPS TENDON REPAIR      CARDIAC CATHETERIZATION      CORONARY ANGIOPLASTY      KNEE ARTHROSCOPY W/ DEBRIDEMENT      SHOULDER ARTHROSCOPY      left    SINUS SURGERY      deivated septum.  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  Son very active in hockey.         Current Outpatient Medications:     atorvastatin (LIPITOR) 40 MG tablet, Take 1 tablet (40 mg total) by mouth every evening., Disp: 90 tablet, Rfl: 0    benzonatate (TESSALON) 200 MG capsule, Take 1 capsule (200 mg total) by mouth 3 (three) times daily as needed for Cough., Disp: 30 capsule, Rfl: 0    carvediloL (COREG) 6.25 MG tablet, Take 1 tablet (6.25 mg total) by mouth 2 (two) times daily., Disp: 180 tablet, Rfl: 3    clopidogreL (PLAVIX) 75 mg tablet, Take 1 tablet (75 mg total) by mouth once daily., Disp: 90 tablet, Rfl: 0    ramipriL (ALTACE) 10 MG capsule, Take 1 capsule (10 mg total) by mouth once daily. Take one by mouth daily for 90 days, Disp: 90 capsule, Rfl: 3    sildenafiL (VIAGRA) 100 MG tablet, Take 1 tablet (100 mg total) by mouth daily as needed for Erectile Dysfunction., Disp: 10 tablet, Rfl: 11   Review of patient's allergies indicates:  No Known Allergies         Review of Systems   Gastrointestinal:         Right groin bulge greater than 2 months.  No left groin bulge.  Easily reducible.  Causing minimal discomfort        I have reviewed the following:     Details / Date    []   Labs     []   Micro     []   Pathology     []   Imaging     []   Cardiology Procedures     [x]   Other Referral note reviewed        Objective         Physical Exam  Vitals and nursing note reviewed.   Constitutional:       Appearance: Normal appearance. He is normal weight.   HENT:      Head: Normocephalic and atraumatic.      Nose: Nose normal.      Mouth/Throat:      Mouth: Mucous membranes are moist.   Eyes:      Extraocular Movements: Extraocular movements  intact.      Pupils: Pupils are equal, round, and reactive to light.   Cardiovascular:      Rate and Rhythm: Normal rate.   Pulmonary:      Effort: Pulmonary effort is normal.   Abdominal:      General: Abdomen is flat.      Palpations: Abdomen is soft.      Hernia: A hernia is present. Hernia is present in the right inguinal area.          Comments: Easily reducible right inguinal hernia   Musculoskeletal:         General: Normal range of motion.      Cervical back: Normal range of motion.   Skin:     General: Skin is warm.   Neurological:      General: No focal deficit present.      Mental Status: He is alert and oriented to person, place, and time. Mental status is at baseline.   Psychiatric:         Mood and Affect: Mood normal.         Behavior: Behavior normal.          Assessment and Plan   69-year-old gentleman with a right inguinal hernia recommend laparoscopic right inguinal hernia.  Risks and benefits have been thoroughly explained to the patient he states he understands and wishes to proceed  1. Right inguinal hernia  -     Vital signs; Standing  -     Insert peripheral IV; Standing  -     Diet NPO; Standing  -     Full code; Standing  -     Case Request Operating Room: REPAIR, HERNIA, INGUINAL, LAPAROSCOPIC  -     Place in Outpatient; Standing  -     Place NITIN hose; Standing    2. Direct inguinal hernia  -     Ambulatory referral/consult to General Surgery    Other orders  -     0.9%  NaCl infusion  -     IP VTE LOW RISK PATIENT; Standing  -     ceFAZolin 2 g in D5W 50 mL IVPB (MB+)       Orders Placed This Encounter   Procedures    Insert peripheral IV        An After Visit Summary was printed and given to the patient.       Kash Wallace MD  [facility] 2nd Floor General Surgery  [address] [facility] [place] [address]  [phone]     This note was created using [employer] direct voice recognition software. Note may have occasional typographical errors that may not have been identified and  edited despite initial review prior to signing.     Patient instructed that best way to communicate with my office staff is for patient to get on the [facility] patient portal to expedite communication and communication issues that may occur.  Patient was given instructions on how to get on the portal.  I encouraged patient to obtain portal access as well.  Ultimately it is up to the patient to obtain access.  Patient voiced understanding.

## 2024-11-20 ENCOUNTER — TELEPHONE (OUTPATIENT)
Dept: SURGERY | Facility: CLINIC | Age: 69
End: 2024-11-20
Payer: MEDICARE

## 2024-11-20 NOTE — TELEPHONE ENCOUNTER
Attempted to contact Basim Menendez to discuss  the dates he needs to hold his plavix for procedure with Dr. Wallace .    Left voice mail to return our call at [phone] on [phone] (home).    Nilton Dias MA

## 2024-11-26 ENCOUNTER — PATIENT MESSAGE (OUTPATIENT)
Facility: CLINIC | Age: 69
End: 2024-11-26
Payer: MEDICARE

## 2024-11-26 ENCOUNTER — LAB VISIT (OUTPATIENT)
Dept: LAB | Facility: HOSPITAL | Age: 69
End: 2024-11-26
Attending: INTERNAL MEDICINE
Payer: MEDICARE

## 2024-11-26 DIAGNOSIS — Z12.12 SCREENING FOR MALIGNANT NEOPLASM OF THE RECTUM: ICD-10-CM

## 2024-11-26 LAB — HEMOCCULT STL QL IA: NEGATIVE

## 2024-11-26 PROCEDURE — 82274 ASSAY TEST FOR BLOOD FECAL: CPT | Performed by: INTERNAL MEDICINE

## 2024-11-29 DIAGNOSIS — J32.9 SINUSITIS, UNSPECIFIED CHRONICITY, UNSPECIFIED LOCATION: Primary | ICD-10-CM

## 2024-11-29 RX ORDER — METHYLPREDNISOLONE 4 MG/1
TABLET ORAL
Qty: 21 EACH | Refills: 0 | Status: SHIPPED | OUTPATIENT
Start: 2024-11-29 | End: 2024-12-20

## 2024-11-29 RX ORDER — DOXYCYCLINE HYCLATE 100 MG
100 TABLET ORAL 2 TIMES DAILY
Qty: 10 TABLET | Refills: 0 | Status: SHIPPED | OUTPATIENT
Start: 2024-11-29 | End: 2024-12-04

## 2024-11-30 DIAGNOSIS — I25.118 CORONARY ARTERY DISEASE OF NATIVE ARTERY OF NATIVE HEART WITH STABLE ANGINA PECTORIS: ICD-10-CM

## 2024-11-30 DIAGNOSIS — I20.9 ANGINA PECTORIS: ICD-10-CM

## 2024-11-30 DIAGNOSIS — Z95.5 S/P DRUG ELUTING CORONARY STENT PLACEMENT: ICD-10-CM

## 2024-11-30 DIAGNOSIS — I10 ESSENTIAL HYPERTENSION: Chronic | ICD-10-CM

## 2024-11-30 RX ORDER — CARVEDILOL 6.25 MG/1
6.25 TABLET ORAL 2 TIMES DAILY
Qty: 180 TABLET | Refills: 0 | Status: SHIPPED | OUTPATIENT
Start: 2024-11-30 | End: 2025-02-28

## 2024-11-30 RX ORDER — RAMIPRIL 10 MG/1
10 CAPSULE ORAL DAILY
Qty: 90 CAPSULE | Refills: 0 | Status: SHIPPED | OUTPATIENT
Start: 2024-11-30 | End: 2025-02-28

## 2024-12-06 ENCOUNTER — ANESTHESIA EVENT (OUTPATIENT)
Dept: SURGERY | Facility: HOSPITAL | Age: 69
End: 2024-12-06
Payer: MEDICARE

## 2024-12-06 NOTE — ANESTHESIA PREPROCEDURE EVALUATION
12/06/2024  Basim Menendez is a 69 y.o., male.   has a past surgical history that includes Ankle fracture surgery; Knee arthroscopy w/ debridement; Biceps tendon repair; Shoulder arthroscopy; Sinus surgery; Cardiac catheterization; Coronary angioplasty; and Skin cancer excision (2020).       Pre-op Assessment    I have reviewed the Patient Summary Reports.     I have reviewed the Nursing Notes. I have reviewed the NPO Status.   I have reviewed the Medications.     Review of Systems  Anesthesia Hx:  No problems with previous Anesthesia             Denies Family Hx of Anesthesia complications.    Denies Personal Hx of Anesthesia complications.                    Social:  Former Smoker       Hematology/Oncology:       -- Anemia:                                  EENT/Dental:  EENT/Dental Normal           Cardiovascular:     Hypertension   CAD           hyperlipidemia   ECG has been reviewed. Cardiac stent- 3 YOVANY 2/2/2016  10/23 ECG- SB  12/21 stress echo- · The patient's exercise capacity was excellent. Achieved 17 METS.  · The patient reached the end of the protocol.  · During stress, the following significant arrhythmias were observed: rare PVCs.  · The left ventricle is normal in size with concentric remodeling and normal systolic function.  · The estimated ejection fraction is 58%.  · Normal left ventricular diastolic function.  · Normal right ventricular size with normal right ventricular systolic function.  · Small pericardial effusion.  · Mild right atrial enlargement.  · The stress echo portion of this study is negative for myocardial ischemia.  · The ECG portion of this study is abnormal but not diagnostic for ischemia.  · Coreg 6.25 mg taken the evening prior to this examination.  · Patient reported shortness of breath during stress. Shortness of breath reported by the patient was mild.  · No  significant change from Echo in 2/2016.                                  Pulmonary:  Pulmonary Normal                       Renal/:    BPH              Hepatic/GI:  Hepatic/GI Normal                    Endocrine:  Endocrine Normal            Psych:  Psychiatric History anxiety               Physical Exam  General: Well nourished and Cooperative    Airway:  Mallampati: III   Mouth Opening: Normal  TM Distance: Normal  Tongue: Normal  Neck ROM: Normal ROM    Dental:  Intact    Chest/Lungs:  Clear to auscultation, Normal Respiratory Rate    Heart:  Rate: Normal  Rhythm: Regular Rhythm      Anesthesia Plan  Type of Anesthesia, risks & benefits discussed:    Anesthesia Type: Gen ETT  Intra-op Monitoring Plan: Standard ASA Monitors  Post Op Pain Control Plan: multimodal analgesia  Induction:  IV  Airway Plan: Video  Informed Consent: Informed consent signed with the Patient and all parties understand the risks and agree with anesthesia plan.  All questions answered. Patient consented to blood products? Yes  ASA Score: 3  Day of Surgery Review of History & Physical: H&P Update referred to the surgeon/provider.    Ready For Surgery From Anesthesia Perspective.     .

## 2024-12-09 ENCOUNTER — PATIENT MESSAGE (OUTPATIENT)
Facility: CLINIC | Age: 69
End: 2024-12-09
Payer: MEDICARE

## 2024-12-30 ENCOUNTER — E-VISIT (OUTPATIENT)
Dept: FAMILY MEDICINE | Facility: CLINIC | Age: 69
End: 2024-12-30
Payer: MEDICARE

## 2024-12-30 DIAGNOSIS — N52.01 ERECTILE DYSFUNCTION DUE TO ARTERIAL INSUFFICIENCY: ICD-10-CM

## 2024-12-30 DIAGNOSIS — N40.1 BENIGN PROSTATIC HYPERPLASIA WITH URINARY FREQUENCY: Primary | ICD-10-CM

## 2024-12-30 DIAGNOSIS — R35.0 BENIGN PROSTATIC HYPERPLASIA WITH URINARY FREQUENCY: Primary | ICD-10-CM

## 2024-12-30 RX ORDER — TADALAFIL 5 MG/1
5 TABLET ORAL DAILY PRN
Qty: 90 TABLET | Refills: 1 | Status: SHIPPED | OUTPATIENT
Start: 2024-12-30 | End: 2025-06-28

## 2024-12-31 ENCOUNTER — OFFICE VISIT (OUTPATIENT)
Dept: CARDIOLOGY | Facility: CLINIC | Age: 69
End: 2024-12-31
Payer: MEDICARE

## 2024-12-31 VITALS
BODY MASS INDEX: 23.22 KG/M2 | SYSTOLIC BLOOD PRESSURE: 145 MMHG | HEIGHT: 70 IN | HEART RATE: 52 BPM | WEIGHT: 162.19 LBS | OXYGEN SATURATION: 97 % | DIASTOLIC BLOOD PRESSURE: 81 MMHG

## 2024-12-31 DIAGNOSIS — Z01.810 PREOP CARDIOVASCULAR EXAM: Primary | ICD-10-CM

## 2024-12-31 DIAGNOSIS — I25.10 CORONARY ARTERY DISEASE INVOLVING NATIVE CORONARY ARTERY OF NATIVE HEART WITHOUT ANGINA PECTORIS: ICD-10-CM

## 2024-12-31 DIAGNOSIS — E87.1 HYPONATREMIA: ICD-10-CM

## 2024-12-31 DIAGNOSIS — E78.5 DYSLIPIDEMIA: Chronic | ICD-10-CM

## 2024-12-31 DIAGNOSIS — R00.1 BRADYCARDIA, DRUG INDUCED: ICD-10-CM

## 2024-12-31 DIAGNOSIS — Z95.5 S/P DRUG ELUTING CORONARY STENT PLACEMENT: ICD-10-CM

## 2024-12-31 DIAGNOSIS — I10 PRIMARY HYPERTENSION: ICD-10-CM

## 2024-12-31 DIAGNOSIS — T50.905A BRADYCARDIA, DRUG INDUCED: ICD-10-CM

## 2024-12-31 PROBLEM — I20.9 ANGINA PECTORIS: Status: RESOLVED | Noted: 2021-04-15 | Resolved: 2024-12-31

## 2024-12-31 PROCEDURE — 3061F NEG MICROALBUMINURIA REV: CPT | Mod: CPTII,S$GLB,, | Performed by: INTERNAL MEDICINE

## 2024-12-31 PROCEDURE — 3066F NEPHROPATHY DOC TX: CPT | Mod: CPTII,S$GLB,, | Performed by: INTERNAL MEDICINE

## 2024-12-31 PROCEDURE — 1159F MED LIST DOCD IN RCRD: CPT | Mod: CPTII,S$GLB,, | Performed by: INTERNAL MEDICINE

## 2024-12-31 PROCEDURE — 3008F BODY MASS INDEX DOCD: CPT | Mod: CPTII,S$GLB,, | Performed by: INTERNAL MEDICINE

## 2024-12-31 PROCEDURE — 1125F AMNT PAIN NOTED PAIN PRSNT: CPT | Mod: CPTII,S$GLB,, | Performed by: INTERNAL MEDICINE

## 2024-12-31 PROCEDURE — 3077F SYST BP >= 140 MM HG: CPT | Mod: CPTII,S$GLB,, | Performed by: INTERNAL MEDICINE

## 2024-12-31 PROCEDURE — 3079F DIAST BP 80-89 MM HG: CPT | Mod: CPTII,S$GLB,, | Performed by: INTERNAL MEDICINE

## 2024-12-31 PROCEDURE — 3288F FALL RISK ASSESSMENT DOCD: CPT | Mod: CPTII,S$GLB,, | Performed by: INTERNAL MEDICINE

## 2024-12-31 PROCEDURE — 99999 PR PBB SHADOW E&M-EST. PATIENT-LVL III: CPT | Mod: PBBFAC,,, | Performed by: INTERNAL MEDICINE

## 2024-12-31 PROCEDURE — 99215 OFFICE O/P EST HI 40 MIN: CPT | Mod: S$GLB,,, | Performed by: INTERNAL MEDICINE

## 2024-12-31 PROCEDURE — 4010F ACE/ARB THERAPY RXD/TAKEN: CPT | Mod: CPTII,S$GLB,, | Performed by: INTERNAL MEDICINE

## 2024-12-31 PROCEDURE — 93000 ELECTROCARDIOGRAM COMPLETE: CPT | Mod: S$GLB,,, | Performed by: INTERNAL MEDICINE

## 2024-12-31 PROCEDURE — 1101F PT FALLS ASSESS-DOCD LE1/YR: CPT | Mod: CPTII,S$GLB,, | Performed by: INTERNAL MEDICINE

## 2024-12-31 NOTE — PROGRESS NOTES
Subjective:       Patient ID: Basim Menendez is a 69 y.o. male.    Chief Complaint: ED    HPI 68 y/o white male h/o ED . He's requesting to trty daily cialis for that and possible mild BPH    Review of Systems as per HPI      Objective:      Physical Exam   deferred sec to nature of e visit  Assessment:       BPH  ED     Plan:       Add daily Cialis  Monitor PSA

## 2024-12-31 NOTE — PROGRESS NOTES
"Subjective:    Patient ID:  Basim Menendez is a 69 y.o. male who presents for evaluation of Annual Exam (Cardiac clearance)  For complex PCI of RCA, 2/1 to 2/3/2016, some chest tightness with running  General surgeon and referred by Kash Wallace MD for REPAIR, HERNIA, INGUINAL, LAPAROSCOPIC (Right: Groin), no date yet  PCP: Dr. TAMIKA Wnag Delanson, started in 2017  Eye: Maria Victoria Cruz  Prior Cardiologist: Dr. 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    Health literacy: medium  Vaccinations: up-to-date including COVID, no infection  Activities: to gym or jog, 5 days weekly for an hour, no further chest tightness, pickle ball at least 3 times weekly for 2 hours. No problem, not limited.   Nicotine: quit 1999, 20 py  Alcohol: socially, 1-2 per week, max 1 in 24 hours.  Illicit drugs: none  Cardiac symptoms: none  Home BP: 116 to 130 /75 to 82  Medication compliance: yes, do not skip  Diet: regular, no added salt  Caffeine: 3-4 cpd, sleep OK  Labs: 2/2016, LDL 58, on Rx, recent lab result not available, told of good results  No results found for: "TSH"     Lab Results   Component Value Date    HGBA1C 5.3 02/28/2023       Lab Results   Component Value Date    WBC 4.9 03/31/2022    HGB 14.0 03/31/2022    HCT 42.2 03/31/2022    MCV 90.6 03/31/2022     03/31/2022       CMP  Sodium   Date Value Ref Range Status   11/19/2024 131 (L) 136 - 145 mmol/L Final     Potassium   Date Value Ref Range Status   11/19/2024 4.5 3.5 - 5.1 mmol/L Final     Chloride   Date Value Ref Range Status   11/19/2024 101 95 - 110 mmol/L Final     CO2   Date Value Ref Range Status   11/19/2024 23 23 - 29 mmol/L Final     Glucose   Date Value Ref Range Status   11/19/2024 107 70 - 110 mg/dL Final     BUN   Date Value Ref Range Status   11/19/2024 12 8 - 23 mg/dL Final     Creatinine " "  Date Value Ref Range Status   11/19/2024 0.8 0.5 - 1.4 mg/dL Final     Calcium   Date Value Ref Range Status   11/19/2024 9.0 8.7 - 10.5 mg/dL Final     Total Protein   Date Value Ref Range Status   11/19/2024 6.3 6.0 - 8.4 g/dL Final     Albumin   Date Value Ref Range Status   11/19/2024 3.6 3.5 - 5.2 g/dL Final     Total Bilirubin   Date Value Ref Range Status   11/19/2024 0.7 0.1 - 1.0 mg/dL Final     Comment:     For infants and newborns, interpretation of results should be based  on gestational age, weight and in agreement with clinical  observations.    Premature Infant recommended reference ranges:  Up to 24 hours.............<8.0 mg/dL  Up to 48 hours............<12.0 mg/dL  3-5 days..................<15.0 mg/dL  6-29 days.................<15.0 mg/dL       Alkaline Phosphatase   Date Value Ref Range Status   11/19/2024 70 40 - 150 U/L Final     AST   Date Value Ref Range Status   11/19/2024 31 10 - 40 U/L Final     ALT   Date Value Ref Range Status   11/19/2024 32 10 - 44 U/L Final     Anion Gap   Date Value Ref Range Status   11/19/2024 7 (L) 8 - 16 mmol/L Final     eGFR if    Date Value Ref Range Status   02/03/2016 >60.0 >60 mL/min/1.73 m^2 Final     eGFR    Date Value Ref Range Status   03/31/2022 >60 >60 mL/min/1.73m2 Final     eGFR if non    Date Value Ref Range Status   02/03/2016 >60.0 >60 mL/min/1.73 m^2 Final     Comment:     Calculation used to obtain the estimated glomerular filtration  rate (eGFR) is the CKD-EPI equation. Since race is unknown   in our information system, the eGFR values for   -American and Non--American patients are given   for each creatinine result.       eGFR   Date Value Ref Range Status   03/31/2022 >60 >60 mL/min/1.73m2 Final     @labrcntip(troponini)@  No results found for: "BNP"}   Lab Results   Component Value Date    CHOL 139 11/19/2024    CHOL 122 02/28/2023    CHOL 123 02/01/2016     Lab Results " "  Component Value Date    HDL 42 2024    HDL 41 2023    HDL 50 2016     Lab Results   Component Value Date    LDLCALC 83.2 2024    LDLCALC 56 2023    LDLCALC 58.0 (L) 2016     Lab Results   Component Value Date    TRIG 69 2024    TRIG 125 2023    TRIG 75 2016     Lab Results   Component Value Date    CHOLHDL 30.2 2024    CHOLHDL 40.7 2016    CHOLHDL 23.2 2015     Outside labs reviewed 3/2021: LDL-C 70 on atorvastatin 40 mg, normal A!C, CMP, TSH and no microalbuminuria, have elevated PSA   2024 negative for microalbuminuria    Last Echo: TMET Echo 2021  Last stress test:   Cardiovascular angiogram: 2016 with PCI  ECG: SB, rate 50  Fundoscopic exam: yearly, no retinopathy    In 2016:  DCS - "Attending Physician: Celestina Craig MD     Reason for Admission: CAD transfer for PCI     Procedures Performed: Procedure(s) (LRB):  HEART CATH-LEFT (Left)  [id number]-CORONARY (PTCA) with rotablator (Left)    Hospital Course HPI: Basim Menendez is a 60 y.o. male with ho HTN, dyslipidemia and fam hx of heart disease (father  of MI at 56, he also had type 1 DM). Pt transferred from [facility] for PCI if PDA which was unsuccessful at OSH, he also had successful YOVANY placement to the distal RCA (CD is on chart and was reviewed by interventional cardiology- Dr Boland). No other records are available except for cath CD. Pt says cath was elective and done because of an abnormal stress test. He has been having exertional chest pain for the past year. He describes tightness that sometimes only lasts seconds but can last several minutes, it sometimes resolves with continued exercise other times resolves with rest. He denies associated dyspnea, nausea, vomiting, diaphoresis or palpitations.    Course: Patient admitted to the Cardiology Team B Service and placed on 24 hour telemetry  //CAD- on admit continued " "Aggrastat, heparin and Brilinta. Coreg and Crestor were started. Patient remained chest pain free throughout hospital stay. 2D echo done and EF preserved: 55-60%. S/p PCI of RCA/PDA with rotablator and YOVANY. Will discharge today with follow up with Dr. Dueñas with [facility] in [place] at patients request and Cardiac Rehab. Patient educated on lifestyle modifications and importance of compliance with medications.    //HTN--Coreg started on admit and -160's. Will add low dose diuretic on discharge.  //DLD -takes crestor at home. Unsure of dose (pt does not have list or pill bottles with him). Would continue current dose as lipids are controlled  -total chol 123 and LDL 58"    ECHO 2/2016 CONCLUSIONS     1 - Normal left ventricular systolic function (EF 55-60%).     2 - Concentric remodeling. LV wall thickness is normal, with the septum measuring 1.2 cm and the posterior wall measuring 1.1 cm across.    3 - Normal left ventricular diastolic function.     4 - Normal right ventricular systolic function .     5 - Trivial mitral regurgitation.     6 - Intermediate central venous pressure.     PCI, 2/2/2016  HEMODYNAMIC RESULTS:    BP: 102/60    C. ANGIOGRAPHIC RESULTS:    DIAGNOSTIC:       Patient has a right dominant coronary artery.        - Right Coronary Artery:             The RCA is diffusely diseased. There is XOCHILT 3 flow.               The proximal RCA has a 75% stenosis. There is XOCHILT 3 flow.       - Posterior Descending Artery:             The proximal PDA has a 90% stenosis. There is XOCHILT 3 flow.               The proximal PDA has a 90% stenosis. There is XOCHILT 3 flow.               The distal PDA has luminal irregularities. There is XOCHILT 3 flow.       - Right Ventricle:             The right ventricle was not studied.       - Common Femoral Artery:             The left CFA has plaque. There is XOCHILT 3 flow.       - Femoral Artery:             The left femoral artery has plaque. There is XOCIHLT 3 " flow.    INTERVENTION:         RCA:              The intervention of the lesion was unsuccessful, secondary to . The vessel is diffusely diseased. The vessel was accessed natively.  The following items were used: [place] And Advancer Combo 1.50.       Proximal RCA:              The lesion was successfully intervened. Post-stenosis of 0%, post-XOCHILT 3 flow and TMP grade 3. The vessel was accessed natively.  The following items were used: Sprinter Balln Otw 2.0 X 10, 2.5X15 Voyager Rx Balloon, Stent Resolute Rx 3.0x38   (YOVANY) and Stent Resolute Rx 3.00x15 (YOVANY).       Proximal PDA:              The lesion was successfully intervened. Post-stenosis of 40%, post-XOCHILT 3 flow and TMP grade 3. The vessel was accessed natively.  The following items were used: [place] And Advancer Combo 1.50.       Proximal PDA:              The lesion was successfully intervened. Post-stenosis of 0%, post-XOCHILT 3 flow and TMP grade 3. The vessel was accessed natively.  The following items were used: 2.5X15 Voyager Rx Balloon, Stent Resolute Rx 2.75x18 (YOVANY) and 2.5X08 Voyager NC   Balloon.       Distal PDA:              The following items were used: Sprinter Balln Otw 2.0 X 10 and Rotablator Wire.       Right Ventricle:              The following items were used: 5FR Heart Pacer Cath.    D. SUMMARY/POST-OPERATIVE DIAGNOSIS:    1. Successful PCI.  2. Rotoblstor and stenting of RCA in multiple segments.    E. RECOMMENDATIONS:    1. Routine post-cath care.  2. Continue medical management.  3. Risk factor reductions.  4. ASA 81mg.  5. Ticagrelor (Brilinta) for one year.  6. Follow up with Dr. Fabiano Zabala.    Since DC no further sharp CP, able to bike for 16 miles this past weekend without problem.    Since visit of 2/18/2016, have seasonal allergies, on Z-Pack, which is helpful. Some intermittent very brief and minor CP. Home BP log reviewed 111-177/61-73, mostly under SBP of 130. Compliant with medications. At the gym daily for an hour. Have done  2 sessions of Yoga, enjoy it. To start Cardiac Rehab 4/1.    In 7/2016, completed 2 weeks auto trip to [place], jogged for 2 miles over 30 minutes, also walked 2 9-hole golf course without difficulties, still very occasional brief chest discomfort, no inciting events. Home BP log 118-137/67-77. No problem with medications. All reviewed, timeline discussed. Enjoy playing ice hockey, season start late October, advised to complete at least a year DAPT.    In 4/2019, here for cardiac review, had change in insurance and no follow up since 7/2016. No heart worries. Had recent change in BP regiment to Ramipril 5 mg daily. Discussed HTN Rx, current target < 130/80.    In 4/2021, return after 66 th birthday, wants to comply now. Report stable angina with running for past 2 years, usually at the end of the run. Compliant with medications but have not checked home BP.     In 10/2023, return for follow up with continue stable angina, about once a month. Brief, just seconds. No other heart worries.     TMET Echo 12/2021 - The patient's exercise capacity was excellent. Achieved 17 METS.  The patient reached the end of the protocol.  During stress, the following significant arrhythmias were observed: rare PVCs.  The left ventricle is normal in size with concentric remodeling and normal systolic function.  The estimated ejection fraction is 58%.  Normal left ventricular diastolic function.  Normal right ventricular size with normal right ventricular systolic function.  Small pericardial effusion.  Mild right atrial enlargement.  The stress echo portion of this study is negative for myocardial ischemia.  The ECG portion of this study is abnormal but not diagnostic for ischemia.  Coreg 6.25 mg taken the evening prior to this examination.  Patient reported shortness of breath during stress. Shortness of breath reported by the patient was mild.  No significant change from Echo in 2/2016.    Abdominal US - 3/2023 - NO SONOGRAPHIC  EVIDENCE OF ABDOMINAL AORTIC ANEURYSM     CXR 3/2022 - Hyperinflated lungs, with flattening of the hemidiaphragms and some   biapical, pleural-parenchymal scarring.  No pneumothorax.  No pleural   effusion.  Cardiac silhouette and mediastinal structures unremarkable,     HPI comments: in 12/2024, return for pre-op clearance and annual review. Partially half-way, working remote. No heart issue and active without any further CP.     Review of Systems   Constitution: Negative for diaphoresis, no weakness, malaise/fatigue, night sweats and weight down 6 lbs from 10/2023.   HENT: Negative for headaches, nosebleeds and tinnitus. No sore throat   Eyes: no further redness. Negative for visual disturbance.   Cardiovascular: no further chest pain. Negative for claudication, cyanosis, dyspnea on exertion, irregular heartbeat, leg swelling, near-syncope, orthopnea, palpitations and paroxysmal nocturnal dyspnea.   Respiratory: no cough now (likely seasonal allergies), no shortness of breath, sleep disturbances due to breathing, snoring and wheezing. Groves score 3 , today a 2  Endocrine: Negative for polydipsia and polyuria.   Hematologic/Lymphatic: Does not bruise/bleed easily.   Skin: Negative for nail changes, color change, flushing, poor wound healing and suspicious lesions.   Musculoskeletal: Positive for arthritis, hand, wrist, right shoulder and back pain. Negative for falls, gout, joint pain, joint swelling, muscle cramps, muscle weakness and myalgias.   Gastrointestinal: Negative for heartburn, hematemesis, hematochezia, melena and nausea.   Neurological: Negative for disturbances in coordination, excessive daytime sleepiness, dizziness, focal weakness, light-headedness, loss of balance, numbness and vertigo.   Psychiatric/Behavioral: Negative for depression and substance abuse. The patient does not have insomnia and is not nervous/anxious.      Answers submitted by the patient for this visit:  Review of Systems  "Questionnaire (Submitted on 12/30/2024)  activity change: No  unexpected weight change: No  neck pain: No  hearing loss: No  rhinorrhea: No  trouble swallowing: No  eye discharge: No  visual disturbance: No  chest tightness: No  wheezing: No  chest pain: No  palpitations: No  blood in stool: No  constipation: No  vomiting: No  diarrhea: No  polydipsia: No  polyuria: No  difficulty urinating: No  urgency: No  hematuria: No  joint swelling: No  arthralgias: Yes  headaches: No  weakness: No  confusion: No  dysphoric mood: No     Objective:    Physical Exam   Constitutional: He is oriented to person, place, and time. He appears well-developed and well-nourished. RA O2 saturation 97%   HENT:   Head: Normocephalic.   Eyes: Conjunctivae and EOM are normal. Pupils are equal, round, and reactive to light.   Neck: Normal range of motion. Neck supple. No JVD present. No thyromegaly present.   Circumference 15.5"   Cardiovascular: Normal rate, regular rhythm, normal heart sounds and intact distal pulses.  Exam reveals no gallop and no friction rub.    No murmur heard.  Pulmonary/Chest: Effort normal and breath sounds with prolong expiration. He has no rales. He exhibits no tenderness.   Abdominal: Soft. Bowel sounds are normal. There is no tenderness.   Waist 32.5" increased to 33", hip 39.75"   Musculoskeletal: Normal range of motion. He exhibits no edema.   Lymphadenopathy:     He has no cervical adenopathy.   Neurological: He is alert and oriented to person, place, and time.   Skin: Skin is warm and dry. No rash noted.         Assessment:       1. Preop cardiovascular exam    2. S/P drug eluting coronary stent placement    3. Angina pectoris, onset 2019    4. Primary hypertension    5. Hyponatremia    6. Bradycardia, drug induced    7. Coronary artery disease involving native coronary artery of native heart without angina pectoris    8. Dyslipidemia, baseline          Plan:       Basim was seen today for " follow-up.    Diagnoses and all orders for this visit:    Preop cardiovascular exam  -     IN OFFICE EKG 12-LEAD (to Muse)    S/P drug eluting coronary stent placement  -     IN OFFICE EKG 12-LEAD (to Muse)    Angina pectoris, onset 2019    Primary hypertension  -     IN OFFICE EKG 12-LEAD (to Muse)    Hyponatremia    Bradycardia, drug induced    Coronary artery disease involving native coronary artery of native heart without angina pectoris    Dyslipidemia, baseline     - All medical issues reviewed, continue current Rx.   - Warning signs of MI and stroke given, would chew 2-4 low-dose ASA (81 mg) if symptom last more than 5 minutes and call 911.  - Clear for Surgery and anesthesia with acceptable risk from the cardiac standpoint.   - Avoid use of NSAID if possible, Tylenol is safer. Can use up to 3000 mg per day, use early with onset of even mild pains.  - CV status reviewed, patient acknowledge good understanding.  - Recommend healthy living: moderate alcohol, healthy diet and regular exercise aiming for fitness, restorative sleep and weight control   - Discussed healthy daily limit of 1 oz of pure alcohol in any 24 hours (roughly 2 12-oz beers, 10 oz of wine (8%-12% alcohol), or 1.5 oz of liquor (80 proof)), can not save up.  - Need good exercise program, 4 key elements: 1. Aerobic (walking, swimming, dancing, jogging, biking, etc, 2. Muscle strengthening / resistance exercise, need to do 2-3 times weekly, 3. Stretching daily, good stretch takes a whole  total minute. 4. Balance exercise daily.    - Instruction for Mediterranean diet and heart healthy exercise given.  - Check home blood pressure, 2 days weekly, do 2 readings within 5 minutes in AM and PM, keep log for review. Target resting BP is less than 130/80.   - Highly recommend 30-60 minutes of exercise / activities daily, can have Sunday off, with 2-3 sessions of muscle strengthening weekly. A  would be very helpful.  - Recommend  at least annual cardiovascular evaluation in view of patient's significant risk factors.      Patient Active Problem List   Diagnosis    Coronary artery disease involving native coronary artery of native heart, 3 YOVANY 2/2/2016    Primary hypertension    Dyslipidemia, baseline     Chronic back pain    Anemia    LALI (generalized anxiety disorder)    Benign non-nodular prostatic hyperplasia with lower urinary tract symptoms    Encounter for annual wellness visit (AWV) in Medicare patient    Elevated PSA, between 10 and less than 20 ng/ml    Thumb joint locking    Squamous blepharitis of right upper eyelid    Bradycardia, drug induced    S/P drug eluting coronary stent placement    Pain of right lower extremity    Strain of calf muscle    Viral upper respiratory tract infection    Hyponatremia     Total time spend including review of record prior to face-to-face visit is 40 minutes. Greater than 50% of the time was spent in counseling and coordination of care. The above assessment and plan have been discussed at length. Referring provider's note reviewed. Labs and procedure over the last 6 months reviewed. Problem List updated. Asked to bring in all active medications / pills bottles with next visit. Will send note to referring / PCP.

## 2025-01-02 LAB
OHS QRS DURATION: 90 MS
OHS QTC CALCULATION: 382 MS

## 2025-01-08 ENCOUNTER — ANESTHESIA (OUTPATIENT)
Dept: SURGERY | Facility: HOSPITAL | Age: 70
End: 2025-01-08
Payer: MEDICARE

## 2025-01-14 ENCOUNTER — PATIENT MESSAGE (OUTPATIENT)
Facility: CLINIC | Age: 70
End: 2025-01-14
Payer: MEDICARE

## 2025-02-18 ENCOUNTER — OFFICE VISIT (OUTPATIENT)
Dept: FAMILY MEDICINE | Facility: CLINIC | Age: 70
End: 2025-02-18
Payer: MEDICARE

## 2025-02-18 ENCOUNTER — PATIENT MESSAGE (OUTPATIENT)
Facility: CLINIC | Age: 70
End: 2025-02-18
Payer: MEDICARE

## 2025-02-18 VITALS
SYSTOLIC BLOOD PRESSURE: 149 MMHG | HEART RATE: 52 BPM | OXYGEN SATURATION: 99 % | DIASTOLIC BLOOD PRESSURE: 70 MMHG | WEIGHT: 166.63 LBS | BODY MASS INDEX: 23.85 KG/M2 | HEIGHT: 70 IN

## 2025-02-18 DIAGNOSIS — E78.2 MIXED HYPERLIPIDEMIA: Primary | ICD-10-CM

## 2025-02-18 DIAGNOSIS — I10 ESSENTIAL HYPERTENSION: Chronic | ICD-10-CM

## 2025-02-18 DIAGNOSIS — Z95.5 S/P DRUG ELUTING CORONARY STENT PLACEMENT: ICD-10-CM

## 2025-02-18 DIAGNOSIS — Z00.00 PREVENTATIVE HEALTH CARE: ICD-10-CM

## 2025-02-18 DIAGNOSIS — I25.118 CORONARY ARTERY DISEASE OF NATIVE ARTERY OF NATIVE HEART WITH STABLE ANGINA PECTORIS: ICD-10-CM

## 2025-02-18 DIAGNOSIS — I20.9 ANGINA PECTORIS: ICD-10-CM

## 2025-02-18 DIAGNOSIS — I10 ESSENTIAL HYPERTENSION, BENIGN: ICD-10-CM

## 2025-02-18 DIAGNOSIS — E78.5 HYPERLIPIDEMIA, UNSPECIFIED HYPERLIPIDEMIA TYPE: ICD-10-CM

## 2025-02-18 DIAGNOSIS — E78.5 DYSLIPIDEMIA: Chronic | ICD-10-CM

## 2025-02-18 DIAGNOSIS — I25.10 CORONARY ARTERY DISEASE INVOLVING NATIVE CORONARY ARTERY OF NATIVE HEART WITHOUT ANGINA PECTORIS: ICD-10-CM

## 2025-02-18 DIAGNOSIS — R73.9 ELEVATED BLOOD SUGAR: ICD-10-CM

## 2025-02-18 RX ORDER — RAMIPRIL 10 MG/1
10 CAPSULE ORAL DAILY
Qty: 90 CAPSULE | Refills: 1 | Status: SHIPPED | OUTPATIENT
Start: 2025-02-18 | End: 2025-08-17

## 2025-02-18 RX ORDER — ATORVASTATIN CALCIUM 40 MG/1
40 TABLET, FILM COATED ORAL NIGHTLY
Qty: 90 TABLET | Refills: 1 | Status: SHIPPED | OUTPATIENT
Start: 2025-02-18 | End: 2025-08-17

## 2025-02-18 RX ORDER — CARVEDILOL 6.25 MG/1
6.25 TABLET ORAL 2 TIMES DAILY
Qty: 180 TABLET | Refills: 1 | Status: SHIPPED | OUTPATIENT
Start: 2025-02-18 | End: 2025-08-17

## 2025-02-18 RX ORDER — CLOPIDOGREL BISULFATE 75 MG/1
75 TABLET ORAL DAILY
Qty: 90 TABLET | Refills: 1 | Status: SHIPPED | OUTPATIENT
Start: 2025-02-18 | End: 2025-08-17

## 2025-02-18 NOTE — PROGRESS NOTES
Subjective:       Patient ID: Basim Menendez is a 70 y.o. male.    Chief Complaint: Hypertension (Patient is here for elevated blood pressure. )      History of Present Illness              Review of Systems      Objective:      Physical Exam    Assessment:       1. Mixed hyperlipidemia  Overview:  Usually at goal    Assessment & Plan:  Refill statin    Orders:  -     Lipid Panel; Future; Expected date: 02/18/2025    2. Coronary artery disease of native artery of native heart with stable angina pectoris  Overview:  Stable    Assessment & Plan:  F/u with Card  Refill plavix, coreg, lipitor    Orders:  -     atorvastatin (LIPITOR) 40 MG tablet; Take 1 tablet (40 mg total) by mouth every evening.  Dispense: 90 tablet; Refill: 1  -     clopidogreL (PLAVIX) 75 mg tablet; Take 1 tablet (75 mg total) by mouth once daily.  Dispense: 90 tablet; Refill: 1  -     ramipriL (ALTACE) 10 MG capsule; Take 1 capsule (10 mg total) by mouth once daily. Take one by mouth daily for 90 days  Dispense: 90 capsule; Refill: 1    3. Dyslipidemia, baseline   -     atorvastatin (LIPITOR) 40 MG tablet; Take 1 tablet (40 mg total) by mouth every evening.  Dispense: 90 tablet; Refill: 1    4. Angina pectoris, onset 2019  -     carvediloL (COREG) 6.25 MG tablet; Take 1 tablet (6.25 mg total) by mouth 2 (two) times daily.  Dispense: 180 tablet; Refill: 1    5. S/P drug eluting coronary stent placement  -     carvediloL (COREG) 6.25 MG tablet; Take 1 tablet (6.25 mg total) by mouth 2 (two) times daily.  Dispense: 180 tablet; Refill: 1  -     clopidogreL (PLAVIX) 75 mg tablet; Take 1 tablet (75 mg total) by mouth once daily.  Dispense: 90 tablet; Refill: 1    6. Essential hypertension  -     ramipriL (ALTACE) 10 MG capsule; Take 1 capsule (10 mg total) by mouth once daily. Take one by mouth daily for 90 days  Dispense: 90 capsule; Refill: 1    7. Hyperlipidemia, unspecified hyperlipidemia type    8. Elevated blood sugar  -     Hemoglobin  A1C; Future; Expected date: 02/18/2025    9. Essential hypertension, benign  Overview:  Elevated today    Assessment & Plan:  Re-assess in 2 weeks  T/c adding a small dose of a CCB or HCZ  Diet , wt loss , exercise d/w patient      Orders:  -     Comprehensive Metabolic Panel; Future; Expected date: 02/18/2025  -     Microalbumin/Creatinine Ratio, Urine; Future; Expected date: 02/18/2025    10. Coronary artery disease involving native coronary artery of native heart without angina pectoris  Overview:  Stable    Assessment & Plan:  F/u with Card  Refill plavix, coreg, lipitor      11. Preventative health care  Overview:  See below    Assessment & Plan:  I gave the patient written recommendations re the outstanding vaccinations.  Get another lipid panel               Plan:       Assessment & Plan            Basim was seen today for hypertension.    Diagnoses and all orders for this visit:    Mixed hyperlipidemia  -     Lipid Panel; Future    Coronary artery disease of native artery of native heart with stable angina pectoris  -     atorvastatin (LIPITOR) 40 MG tablet; Take 1 tablet (40 mg total) by mouth every evening.  -     clopidogreL (PLAVIX) 75 mg tablet; Take 1 tablet (75 mg total) by mouth once daily.  -     ramipriL (ALTACE) 10 MG capsule; Take 1 capsule (10 mg total) by mouth once daily. Take one by mouth daily for 90 days    Dyslipidemia, baseline   -     atorvastatin (LIPITOR) 40 MG tablet; Take 1 tablet (40 mg total) by mouth every evening.    Angina pectoris, onset 2019  -     carvediloL (COREG) 6.25 MG tablet; Take 1 tablet (6.25 mg total) by mouth 2 (two) times daily.    S/P drug eluting coronary stent placement  -     carvediloL (COREG) 6.25 MG tablet; Take 1 tablet (6.25 mg total) by mouth 2 (two) times daily.  -     clopidogreL (PLAVIX) 75 mg tablet; Take 1 tablet (75 mg total) by mouth once daily.    Essential hypertension  -     ramipriL (ALTACE) 10 MG capsule; Take 1 capsule (10 mg total)  by mouth once daily. Take one by mouth daily for 90 days    Hyperlipidemia, unspecified hyperlipidemia type    Elevated blood sugar  -     Hemoglobin A1C; Future    Essential hypertension, benign  -     Comprehensive Metabolic Panel; Future  -     Microalbumin/Creatinine Ratio, Urine; Future    Coronary artery disease involving native coronary artery of native heart without angina pectoris    Preventative health care

## 2025-02-18 NOTE — ASSESSMENT & PLAN NOTE
Re-assess in 2 weeks  T/c adding a small dose of a CCB or HCZ  Diet , wt loss , exercise d/w patient

## 2025-02-18 NOTE — ASSESSMENT & PLAN NOTE
I gave the patient written recommendations re the outstanding vaccinations.  Get another lipid panel

## 2025-02-18 NOTE — PROGRESS NOTES
Subjective:       Patient ID: Basim Menendez is a 70 y.o. male.    Chief Complaint: Hypertension (Patient is here for elevated blood pressure. )      Patient is established already .......... presents today for a f/u visit , to discuss elevated BP and Rx of chronic cond    Hypertension  This is a chronic problem. The current episode started more than 1 year ago. The problem has been waxing and waning since onset. The problem is uncontrolled. Associated symptoms include anxiety. There are no associated agents to hypertension. Risk factors for coronary artery disease include family history, male gender, stress and dyslipidemia. Past treatments include beta blockers and ACE inhibitors. The current treatment provides moderate improvement. There are no compliance problems.  Hypertensive end-organ damage includes CAD/MI.     Review of Systems   Constitutional:  Negative for activity change, appetite change and fever.   Respiratory: Negative.     Cardiovascular: Negative.    Gastrointestinal: Negative.    Musculoskeletal: Negative.          Objective:      Physical Exam  Vitals and nursing note reviewed.   Constitutional:       Appearance: Normal appearance.   Cardiovascular:      Rate and Rhythm: Normal rate and regular rhythm.      Pulses: Normal pulses.      Heart sounds: Normal heart sounds. No murmur heard.     No friction rub. No gallop.   Pulmonary:      Effort: Pulmonary effort is normal.      Breath sounds: Normal breath sounds. No wheezing.   Skin:     General: Skin is warm and dry.   Neurological:      Mental Status: He is alert.   Psychiatric:         Mood and Affect: Mood normal.         Assessment:       1. Mixed hyperlipidemia  Overview:  Usually at goal    Assessment & Plan:  Refill statin    Orders:  -     Lipid Panel; Future; Expected date: 02/18/2025    2. Coronary artery disease of native artery of native heart with stable angina pectoris  Overview:  Stable    Assessment & Plan:  F/u with  Card  Refill plavix, coreg, lipitor    Orders:  -     atorvastatin (LIPITOR) 40 MG tablet; Take 1 tablet (40 mg total) by mouth every evening.  Dispense: 90 tablet; Refill: 1  -     clopidogreL (PLAVIX) 75 mg tablet; Take 1 tablet (75 mg total) by mouth once daily.  Dispense: 90 tablet; Refill: 1  -     ramipriL (ALTACE) 10 MG capsule; Take 1 capsule (10 mg total) by mouth once daily. Take one by mouth daily for 90 days  Dispense: 90 capsule; Refill: 1    3. Dyslipidemia, baseline   -     atorvastatin (LIPITOR) 40 MG tablet; Take 1 tablet (40 mg total) by mouth every evening.  Dispense: 90 tablet; Refill: 1    4. Angina pectoris, onset 2019  -     carvediloL (COREG) 6.25 MG tablet; Take 1 tablet (6.25 mg total) by mouth 2 (two) times daily.  Dispense: 180 tablet; Refill: 1    5. S/P drug eluting coronary stent placement  -     carvediloL (COREG) 6.25 MG tablet; Take 1 tablet (6.25 mg total) by mouth 2 (two) times daily.  Dispense: 180 tablet; Refill: 1  -     clopidogreL (PLAVIX) 75 mg tablet; Take 1 tablet (75 mg total) by mouth once daily.  Dispense: 90 tablet; Refill: 1    6. Essential hypertension  -     ramipriL (ALTACE) 10 MG capsule; Take 1 capsule (10 mg total) by mouth once daily. Take one by mouth daily for 90 days  Dispense: 90 capsule; Refill: 1    7. Hyperlipidemia, unspecified hyperlipidemia type    8. Elevated blood sugar  -     Hemoglobin A1C; Future; Expected date: 02/18/2025    9. Essential hypertension, benign  Overview:  Elevated today    Assessment & Plan:  Re-assess in 2 weeks  T/c adding a small dose of a CCB or HCZ  Diet , wt loss , exercise d/w patient      Orders:  -     Comprehensive Metabolic Panel; Future; Expected date: 02/18/2025  -     Microalbumin/Creatinine Ratio, Urine; Future; Expected date: 02/18/2025    10. Coronary artery disease involving native coronary artery of native heart without angina pectoris  Overview:  Stable    Assessment & Plan:  F/u with Card  Refill  plavix, coreg, lipitor      11. Preventative health care  Overview:  See below    Assessment & Plan:  I gave the patient written recommendations re the outstanding vaccinations.  Get another lipid panel               Plan:       1. Mixed hyperlipidemia  Overview:  Usually at goal    Assessment & Plan:  Refill statin    Orders:  -     Lipid Panel; Future; Expected date: 02/18/2025    2. Coronary artery disease of native artery of native heart with stable angina pectoris  Overview:  Stable    Assessment & Plan:  F/u with Card  Refill plavix, coreg, lipitor    Orders:  -     atorvastatin (LIPITOR) 40 MG tablet; Take 1 tablet (40 mg total) by mouth every evening.  Dispense: 90 tablet; Refill: 1  -     clopidogreL (PLAVIX) 75 mg tablet; Take 1 tablet (75 mg total) by mouth once daily.  Dispense: 90 tablet; Refill: 1  -     ramipriL (ALTACE) 10 MG capsule; Take 1 capsule (10 mg total) by mouth once daily. Take one by mouth daily for 90 days  Dispense: 90 capsule; Refill: 1    3. Dyslipidemia, baseline   -     atorvastatin (LIPITOR) 40 MG tablet; Take 1 tablet (40 mg total) by mouth every evening.  Dispense: 90 tablet; Refill: 1    4. Angina pectoris, onset 2019  -     carvediloL (COREG) 6.25 MG tablet; Take 1 tablet (6.25 mg total) by mouth 2 (two) times daily.  Dispense: 180 tablet; Refill: 1    5. S/P drug eluting coronary stent placement  -     carvediloL (COREG) 6.25 MG tablet; Take 1 tablet (6.25 mg total) by mouth 2 (two) times daily.  Dispense: 180 tablet; Refill: 1  -     clopidogreL (PLAVIX) 75 mg tablet; Take 1 tablet (75 mg total) by mouth once daily.  Dispense: 90 tablet; Refill: 1    6. Essential hypertension  -     ramipriL (ALTACE) 10 MG capsule; Take 1 capsule (10 mg total) by mouth once daily. Take one by mouth daily for 90 days  Dispense: 90 capsule; Refill: 1    7. Hyperlipidemia, unspecified hyperlipidemia type    8. Elevated blood sugar  -     Hemoglobin A1C; Future; Expected date:  02/18/2025    9. Essential hypertension, benign  Overview:  Elevated today    Assessment & Plan:  Re-assess in 2 weeks  T/c adding a small dose of a CCB or HCZ  Diet , wt loss , exercise d/w patient      Orders:  -     Comprehensive Metabolic Panel; Future; Expected date: 02/18/2025  -     Microalbumin/Creatinine Ratio, Urine; Future; Expected date: 02/18/2025    10. Coronary artery disease involving native coronary artery of native heart without angina pectoris  Overview:  Stable    Assessment & Plan:  F/u with Card  Refill plavix, coreg, lipitor      11. Preventative health care  Overview:  See below    Assessment & Plan:  I gave the patient written recommendations re the outstanding vaccinations.  Get another lipid panel              Visit today included increased complexity associated with the care of the episodic problem.   I addressed and managing the longitudinal care of the patient due to the serious and/or complex managed problem(s).  .

## 2025-02-21 ENCOUNTER — HOSPITAL ENCOUNTER (OUTPATIENT)
Dept: PREADMISSION TESTING | Facility: HOSPITAL | Age: 70
Discharge: HOME | End: 2025-02-21
Attending: SPECIALIST
Payer: MEDICARE

## 2025-02-21 PROCEDURE — 99900103 DSU ONLY-NO CHARGE-INITIAL HR (STAT)

## 2025-02-21 NOTE — DISCHARGE INSTRUCTIONS
NOTHING TO EAT OR DRINK AFTER MIDNIGHT, OTHER THAN A SMALL SIP OF WATER WITH SPECIFIED MORNING MEDICATIONS.     SHOWER WITH DIAN-HEX THE EVENING AND MORNING PRIOR TO SURGERY.     LEAVE JEWELRY AT HOME.

## 2025-02-24 ENCOUNTER — HOSPITAL ENCOUNTER (OUTPATIENT)
Facility: HOSPITAL | Age: 70
Discharge: HOME | End: 2025-02-24
Attending: SPECIALIST | Admitting: SPECIALIST
Payer: MEDICARE

## 2025-02-24 DIAGNOSIS — K40.90 RIGHT INGUINAL HERNIA: Primary | ICD-10-CM

## 2025-02-24 DIAGNOSIS — R33.9 URINARY RETENTION: Primary | ICD-10-CM

## 2025-02-24 DIAGNOSIS — R31.9 HEMATURIA, UNSPECIFIED TYPE: Primary | ICD-10-CM

## 2025-02-24 PROCEDURE — C1781 MESH (IMPLANTABLE): HCPCS | Performed by: SPECIALIST

## 2025-02-24 PROCEDURE — 63600175 PHARM REV CODE 636 W HCPCS: Performed by: NURSE ANESTHETIST, CERTIFIED REGISTERED

## 2025-02-24 PROCEDURE — C1727 CATH, BAL TIS DIS, NON-VAS: HCPCS | Performed by: SPECIALIST

## 2025-02-24 PROCEDURE — 27201423 OPTIME MED/SURG SUP & DEVICES STERILE SUPPLY: Performed by: SPECIALIST

## 2025-02-24 PROCEDURE — 36000708 HC OR TIME LEV III 1ST 15 MIN: Performed by: SPECIALIST

## 2025-02-24 PROCEDURE — 49650 LAP ING HERNIA REPAIR INIT: CPT | Mod: RT,,, | Performed by: SPECIALIST

## 2025-02-24 PROCEDURE — 63600175 PHARM REV CODE 636 W HCPCS: Performed by: SPECIALIST

## 2025-02-24 PROCEDURE — 36000709 HC OR TIME LEV III EA ADD 15 MIN: Performed by: SPECIALIST

## 2025-02-24 PROCEDURE — 71000033 HC RECOVERY, INTIAL HOUR: Performed by: SPECIALIST

## 2025-02-24 PROCEDURE — 71000015 HC POSTOP RECOV 1ST HR: Performed by: SPECIALIST

## 2025-02-24 PROCEDURE — 37000008 HC ANESTHESIA 1ST 15 MINUTES: Performed by: SPECIALIST

## 2025-02-24 PROCEDURE — 37000009 HC ANESTHESIA EA ADD 15 MINS: Performed by: SPECIALIST

## 2025-02-24 PROCEDURE — 71000039 HC RECOVERY, EACH ADD'L HOUR: Performed by: SPECIALIST

## 2025-02-24 PROCEDURE — C1729 CATH, DRAINAGE: HCPCS | Performed by: SPECIALIST

## 2025-02-24 PROCEDURE — 25000003 PHARM REV CODE 250: Performed by: SPECIALIST

## 2025-02-24 PROCEDURE — 25000003 PHARM REV CODE 250: Performed by: NURSE ANESTHETIST, CERTIFIED REGISTERED

## 2025-02-24 DEVICE — 3DMAX LIGHT MESH, 10.3 CM X 15.7 CM (4.1" X 6.2"), RIGHT, LARGE
Type: IMPLANTABLE DEVICE | Site: GROIN | Status: FUNCTIONAL
Brand: 3DMAX

## 2025-02-24 RX ORDER — KETOROLAC TROMETHAMINE 30 MG/ML
INJECTION, SOLUTION INTRAMUSCULAR; INTRAVENOUS
Status: DISCONTINUED | OUTPATIENT
Start: 2025-02-24 | End: 2025-02-24

## 2025-02-24 RX ORDER — LIDOCAINE HYDROCHLORIDE 20 MG/ML
INJECTION INTRAVENOUS
Status: DISCONTINUED | OUTPATIENT
Start: 2025-02-24 | End: 2025-02-24

## 2025-02-24 RX ORDER — ONDANSETRON HYDROCHLORIDE 2 MG/ML
INJECTION, SOLUTION INTRAVENOUS
Status: DISCONTINUED | OUTPATIENT
Start: 2025-02-24 | End: 2025-02-24

## 2025-02-24 RX ORDER — ROCURONIUM BROMIDE 10 MG/ML
INJECTION, SOLUTION INTRAVENOUS
Status: DISCONTINUED | OUTPATIENT
Start: 2025-02-24 | End: 2025-02-24

## 2025-02-24 RX ORDER — ONDANSETRON 4 MG/1
TABLET, ORALLY DISINTEGRATING ORAL
Status: DISCONTINUED
Start: 2025-02-24 | End: 2025-02-24 | Stop reason: HOSPADM

## 2025-02-24 RX ORDER — LIDOCAINE HYDROCHLORIDE 10 MG/ML
1 INJECTION, SOLUTION EPIDURAL; INFILTRATION; INTRACAUDAL; PERINEURAL ONCE
Status: DISCONTINUED | OUTPATIENT
Start: 2025-02-24 | End: 2025-02-24 | Stop reason: HOSPADM

## 2025-02-24 RX ORDER — FENTANYL CITRATE 50 UG/ML
INJECTION, SOLUTION INTRAMUSCULAR; INTRAVENOUS
Status: DISCONTINUED | OUTPATIENT
Start: 2025-02-24 | End: 2025-02-24

## 2025-02-24 RX ORDER — MIDAZOLAM HYDROCHLORIDE 1 MG/ML
INJECTION INTRAMUSCULAR; INTRAVENOUS
Status: DISCONTINUED | OUTPATIENT
Start: 2025-02-24 | End: 2025-02-24

## 2025-02-24 RX ORDER — SODIUM CHLORIDE, SODIUM LACTATE, POTASSIUM CHLORIDE, CALCIUM CHLORIDE 600; 310; 30; 20 MG/100ML; MG/100ML; MG/100ML; MG/100ML
125 INJECTION, SOLUTION INTRAVENOUS CONTINUOUS
Status: DISCONTINUED | OUTPATIENT
Start: 2025-02-24 | End: 2025-02-24 | Stop reason: HOSPADM

## 2025-02-24 RX ORDER — DEXAMETHASONE SODIUM PHOSPHATE 4 MG/ML
INJECTION, SOLUTION INTRA-ARTICULAR; INTRALESIONAL; INTRAMUSCULAR; INTRAVENOUS; SOFT TISSUE
Status: DISCONTINUED | OUTPATIENT
Start: 2025-02-24 | End: 2025-02-24

## 2025-02-24 RX ORDER — HYDROMORPHONE HYDROCHLORIDE 2 MG/ML
0.5 INJECTION, SOLUTION INTRAMUSCULAR; INTRAVENOUS; SUBCUTANEOUS EVERY 5 MIN PRN
Status: DISCONTINUED | OUTPATIENT
Start: 2025-02-24 | End: 2025-02-24 | Stop reason: HOSPADM

## 2025-02-24 RX ORDER — SODIUM CHLORIDE 9 MG/ML
INJECTION, SOLUTION INTRAVENOUS CONTINUOUS
Status: CANCELLED | OUTPATIENT
Start: 2025-02-24

## 2025-02-24 RX ORDER — OXYCODONE HYDROCHLORIDE 5 MG/1
5 TABLET ORAL ONCE AS NEEDED
Status: DISCONTINUED | OUTPATIENT
Start: 2025-02-24 | End: 2025-02-24 | Stop reason: HOSPADM

## 2025-02-24 RX ORDER — CEFAZOLIN 2 G/1
2 INJECTION, POWDER, FOR SOLUTION INTRAMUSCULAR; INTRAVENOUS
Status: COMPLETED | OUTPATIENT
Start: 2025-02-24 | End: 2025-02-24

## 2025-02-24 RX ORDER — HYDRALAZINE HYDROCHLORIDE 20 MG/ML
10 INJECTION INTRAMUSCULAR; INTRAVENOUS ONCE
Status: COMPLETED | OUTPATIENT
Start: 2025-02-24 | End: 2025-02-24

## 2025-02-24 RX ORDER — ONDANSETRON 4 MG/1
4 TABLET, ORALLY DISINTEGRATING ORAL ONCE
Status: COMPLETED | OUTPATIENT
Start: 2025-02-24 | End: 2025-02-24

## 2025-02-24 RX ORDER — TAMSULOSIN HYDROCHLORIDE 0.4 MG/1
0.4 CAPSULE ORAL DAILY
Qty: 30 CAPSULE | Refills: 0 | OUTPATIENT
Start: 2025-02-24 | End: 2026-02-24

## 2025-02-24 RX ORDER — SODIUM CHLORIDE, SODIUM LACTATE, POTASSIUM CHLORIDE, CALCIUM CHLORIDE 600; 310; 30; 20 MG/100ML; MG/100ML; MG/100ML; MG/100ML
INJECTION, SOLUTION INTRAVENOUS CONTINUOUS
Status: DISCONTINUED | OUTPATIENT
Start: 2025-02-24 | End: 2025-02-24 | Stop reason: HOSPADM

## 2025-02-24 RX ORDER — OXYCODONE AND ACETAMINOPHEN 5; 325 MG/1; MG/1
1 TABLET ORAL EVERY 4 HOURS PRN
Qty: 10 TABLET | Refills: 0 | Status: SHIPPED | OUTPATIENT
Start: 2025-02-24

## 2025-02-24 RX ORDER — SODIUM CHLORIDE 9 MG/ML
INJECTION, SOLUTION INTRAVENOUS CONTINUOUS
Status: DISCONTINUED | OUTPATIENT
Start: 2025-02-24 | End: 2025-02-24 | Stop reason: HOSPADM

## 2025-02-24 RX ORDER — HYDRALAZINE HYDROCHLORIDE 20 MG/ML
INJECTION INTRAMUSCULAR; INTRAVENOUS
Status: DISCONTINUED
Start: 2025-02-24 | End: 2025-02-24 | Stop reason: HOSPADM

## 2025-02-24 RX ORDER — EPHEDRINE SULFATE 50 MG/ML
INJECTION, SOLUTION INTRAVENOUS
Status: DISCONTINUED | OUTPATIENT
Start: 2025-02-24 | End: 2025-02-24

## 2025-02-24 RX ORDER — GLUCAGON 1 MG
1 KIT INJECTION
Status: DISCONTINUED | OUTPATIENT
Start: 2025-02-24 | End: 2025-02-24 | Stop reason: HOSPADM

## 2025-02-24 RX ORDER — OXYCODONE AND ACETAMINOPHEN 5; 325 MG/1; MG/1
1 TABLET ORAL EVERY 6 HOURS PRN
Qty: 20 TABLET | Refills: 0 | OUTPATIENT
Start: 2025-02-24

## 2025-02-24 RX ORDER — ONDANSETRON HYDROCHLORIDE 2 MG/ML
4 INJECTION, SOLUTION INTRAVENOUS DAILY PRN
Status: DISCONTINUED | OUTPATIENT
Start: 2025-02-24 | End: 2025-02-24 | Stop reason: HOSPADM

## 2025-02-24 RX ORDER — PROPOFOL 10 MG/ML
VIAL (ML) INTRAVENOUS
Status: DISCONTINUED | OUTPATIENT
Start: 2025-02-24 | End: 2025-02-24

## 2025-02-24 RX ORDER — ACETAMINOPHEN 10 MG/ML
INJECTION, SOLUTION INTRAVENOUS
Status: DISCONTINUED | OUTPATIENT
Start: 2025-02-24 | End: 2025-02-24

## 2025-02-24 RX ADMIN — SODIUM CHLORIDE, POTASSIUM CHLORIDE, SODIUM LACTATE AND CALCIUM CHLORIDE: 600; 310; 30; 20 INJECTION, SOLUTION INTRAVENOUS at 07:02

## 2025-02-24 RX ADMIN — CEFAZOLIN 2 G: 2 INJECTION, POWDER, FOR SOLUTION INTRAMUSCULAR; INTRAVENOUS at 08:02

## 2025-02-24 RX ADMIN — ONDANSETRON 4 MG: 4 TABLET, ORALLY DISINTEGRATING ORAL at 12:02

## 2025-02-24 RX ADMIN — EPHEDRINE SULFATE 15 MG: 50 INJECTION INTRAVENOUS at 08:02

## 2025-02-24 RX ADMIN — DEXAMETHASONE SODIUM PHOSPHATE 8 MG: 4 INJECTION, SOLUTION INTRAMUSCULAR; INTRAVENOUS at 08:02

## 2025-02-24 RX ADMIN — EPHEDRINE SULFATE 20 MG: 50 INJECTION INTRAVENOUS at 08:02

## 2025-02-24 RX ADMIN — HYDROMORPHONE HYDROCHLORIDE 0.5 MG: 2 INJECTION INTRAMUSCULAR; INTRAVENOUS; SUBCUTANEOUS at 11:02

## 2025-02-24 RX ADMIN — FENTANYL CITRATE 100 MCG: 50 INJECTION INTRAMUSCULAR; INTRAVENOUS at 08:02

## 2025-02-24 RX ADMIN — MIDAZOLAM HYDROCHLORIDE 2 MG: 1 INJECTION, SOLUTION INTRAMUSCULAR; INTRAVENOUS at 08:02

## 2025-02-24 RX ADMIN — ONDANSETRON 4 MG: 2 INJECTION INTRAMUSCULAR; INTRAVENOUS at 09:02

## 2025-02-24 RX ADMIN — SUGAMMADEX 200 MG: 100 INJECTION, SOLUTION INTRAVENOUS at 09:02

## 2025-02-24 RX ADMIN — PROPOFOL 150 MG: 10 INJECTION, EMULSION INTRAVENOUS at 08:02

## 2025-02-24 RX ADMIN — ACETAMINOPHEN 1000 MG: 10 INJECTION INTRAVENOUS at 09:02

## 2025-02-24 RX ADMIN — ROCURONIUM BROMIDE 20 MG: 10 INJECTION, SOLUTION INTRAVENOUS at 09:02

## 2025-02-24 RX ADMIN — HYDROMORPHONE HYDROCHLORIDE 0.5 MG: 2 INJECTION INTRAMUSCULAR; INTRAVENOUS; SUBCUTANEOUS at 10:02

## 2025-02-24 RX ADMIN — LIDOCAINE HYDROCHLORIDE 40 MG: 20 INJECTION, SOLUTION INTRAVENOUS at 08:02

## 2025-02-24 RX ADMIN — ROCURONIUM BROMIDE 30 MG: 10 INJECTION, SOLUTION INTRAVENOUS at 08:02

## 2025-02-24 RX ADMIN — ONDANSETRON 4 MG: 2 INJECTION INTRAMUSCULAR; INTRAVENOUS at 10:02

## 2025-02-24 RX ADMIN — HYDRALAZINE HYDROCHLORIDE 10 MG: 20 INJECTION INTRAMUSCULAR; INTRAVENOUS at 10:02

## 2025-02-24 RX ADMIN — KETOROLAC TROMETHAMINE 30 MG: 30 INJECTION, SOLUTION INTRAMUSCULAR at 09:02

## 2025-02-24 NOTE — OP NOTE
Patient: Basim Menendez     Date of Procedure: 2/24/2025    Procedure:  Laparoscopic preperitoneal repair of a right inguinal hernia direct type    Surgeon: Kash Wallace MD    Assistant: None    Pre-op Diagnosis: Right inguinal hernia [K40.90]     Post-op Diagnosis: Right inguinal hernia [K40.90] direct    Procedure in Detail:  After informed consent was obtained, consent form signed, and questions answered, the surgical site was identified and marked appropriately.  The patient was then taken to the operating room where general anesthesia was induced. Prophylactic IV antibiotics were administered.  An attempted in and out catheterization was performed.  We are unable to catheter of the bladder with a normal Resendiz catheter and had to use a coude catheter to gain entrance into the bladder.  There was no blood on the tip of the Resendiz when it was initially tried to be put in in the coude catheter was then with some degree of difficulty but was able to get into the bladder proper.  Clear urine was obtained.  Balloon was insufflated and attached to a Resendiz drainage bag.  The patient was positioned and the surgical field was prepped and draped in the usual sterile fashion. A thorough time-out procedure was performed with the surgical team.    Next using an open technique the preperitoneal space was entered just inferior to the umbilical region just to the right of the midline.  Balloon dissection was carried out of the preperitoneal space.  Balloon operating trocar was removed and operating trocar was placed.  Preperitoneal was insufflated to 14 mm of mercury CO2.  Under direct visualization 2 separate trocar was placed in a linear fashion between the umbilical and the pubic region just to the left of the midline.  No visceral injuries were encountered or Were noted.  There was evidence of a right direct inguinal hernia this was stripped out of the hernia sac and posterior and cephalad off the external iliac structures.   There was a small indirect sac going into the proximal canal and this was stripped posterior and cephalad off the external iliac structures care being taken to identify and preserve the vas throughout the entire procedure hemostasis was noted in the preperitoneal space.  Next Bard 3D mesh light large right mesh was placed in the preperitoneal space and anchored medially with 3 separate absorbable tacks to the superior pubic fascia.  No lateral tacks were placed mesh was in excellent position covering the defect.  Preperitoneal space was next deflated under direct visualization.  Mesh retained its good position covering the defect well.  Trocar was removed at the end of this process.  Fascia was closed at the superior incision using interrupted 0 Vicryl suture.  Skin was closed with 4-0 Monocryl at all sites.  The end the procedure all needle and sponge counts were correct x2.  Coude catheter was left in place to be removed postoperatively as an outpatient.  Postoperative consultation will be made with urology.  At the end the procedure all counts were correct x2 and patient was brought to the recovery room, extubated in hemodynamically stable condition.    Dressings were applied and the patient was awakened and transferred to the recovery room in stable condition. There were no immediate complications.    Specimen:  None    EBL:  Less than 3 cc    Complications: None    This note was created using [employer] direct voice recognition software. Note may have occasional typographical errors that may not have been identified and edited despite initial review prior to signing.

## 2025-02-24 NOTE — DISCHARGE SUMMARY
[facility] Surgery  Discharge Note  Short Stay    Procedure(s) (LRB):  REPAIR, HERNIA, INGUINAL, LAPAROSCOPIC (Right)      OUTCOME: Patient tolerated treatment/procedure well without complication and is now ready for discharge.    DISPOSITION: Home or Self Care    FINAL DIAGNOSIS:  Right direct inguinal hernia    FOLLOWUP: In clinic in 1 week    DISCHARGE INSTRUCTIONS:  Patient is to maintain Resendiz catheter in place until seen by Urology.  We will discharge home on Flomax as well.    TIME SPENT ON DISCHARGE:  10 minutes

## 2025-02-24 NOTE — PLAN OF CARE
"Copy of dc paperwork in blue discharge folder and implant card provided. Instructed pt on catheter care and demonstrated how to change urinary drainage bag over to leg bag. Pt states he had a catheter a couple years ago and is slightly familiar with how to care for a catheter. Pt voiced concerns about the urology appointment being so far away which is March 3rd. CARL Reid RN, at bedside speaking with pt regarding concerns. Pt states he feels "nauseated". Will medicate with Zofran 4mg ODT SL x1. Pt also asking about a discharge RX for  "nausea meds". Will send message to Dr. Wallace regarding pts request.  "

## 2025-02-24 NOTE — DISCHARGE INSTRUCTIONS
[facility]   [phone]  [facility]      [phone]    Post-op instructions:  Do not lift anything over 10 pounds. No pushing, no pulling, no bending or no strenuous exercise until released by your surgeon.  You may shower starting tomorrow.  Do not submerge in any water, take a tub bath or go swimming etc. until released by Dr. Wallace.   Keep surgical areas clean and dry.   Wear abdominal binder as instructed.    DERMABOND ADVANCED   Adhesive is a sterile, liquid skin adhesive that holds wound edges together. The film will usually remain in place for 5 to 10 days, then naturally fall of your skin. The following will answer some of your questions and provide instructions for proper care for your wound while it is healing.    Check Wound Appearance   Some swelling, redness, and pain are common with all wounds and normally will go away as the wound heals. If swelling, redness, or pain increases, or if the wound feels warm to the touch, contact a doctor. Also contact a doctor if the wound edges reopen or separate.    Caring for Bandages   Do not place tape directly over the DERMABOND adhesive, because removing the tape later may also remove the film.    Avoid Topical Medications   Do not apply liquid or ointment medications or any other product to your wound while the DERMABOND Adhesive is in place. These may loosen the film before your wound is healed.    Keep Wound Dry and Protected   Apply a clean, dry bandage over the wound if necessary to protect it.   You may occasionally and briely wet your wound in the shower or bath. Do not soak or scrub your wound, do not swim, and avoid periods of heavy perspiration until the DERMABOND Adhesive has naturally fallen off.   After showering or bathing, gently blot your wound dry with a soft towel. If a protective dressing is being used, apply a fresh, dry bandage, keeping the tape off the DERMABOND Adhesive.   Protect your wound from  injury until the skin has had sufficient time to heal.   Do not scratch, rub, or pick at the DERMABOND Adhesive. This may loosen the film before your wound is healed.   Protect the wound from prolonged exposure to sunlight or tanning lamps while the film is in place.

## 2025-02-24 NOTE — PLAN OF CARE
Discharge criteria met. Escorted via wheelchair to personal vehicle in stable condition by TITO Gil.

## 2025-02-24 NOTE — OR NURSING
Dr. Wallace called to room at this time due to not being able to place catheter without difficulty.  He Placed a 18 [place] coude catheter at this time x 3 attempts.  Clear yellow urine noted in bag.

## 2025-02-24 NOTE — TRANSFER OF CARE
"Anesthesia Transfer of Care Note    Patient: Basim Menendez    Procedure(s) Performed: Procedure(s) (LRB):  REPAIR, HERNIA, INGUINAL, LAPAROSCOPIC (Right)    Patient location: PACU    Anesthesia Type: general    Transport from OR: Transported from OR on room air with adequate spontaneous ventilation    Post pain: adequate analgesia    Post assessment: no apparent anesthetic complications and tolerated procedure well    Post vital signs: stable    Level of consciousness: awake and oriented    Nausea/Vomiting: no nausea/vomiting    Complications: none    Transfer of care protocol was followed      Last vitals: Visit Vitals  BP (!) 188/93   Pulse 79   Temp 36.4 °C (97.5 °F) (Temporal)   Resp 18   Ht 5' 10" (1.778 m)   Wt 75.8 kg (167 lb)   SpO2 (!) 94%   BMI 23.96 kg/m²     "

## 2025-02-24 NOTE — ANESTHESIA POSTPROCEDURE EVALUATION
Anesthesia Post Evaluation    Patient: Basim Menendez    Procedure(s) Performed: Procedure(s) (LRB):  REPAIR, HERNIA, INGUINAL, LAPAROSCOPIC (Right)    Final Anesthesia Type: general      Patient location during evaluation: PACU  Patient participation: Yes- Able to Participate  Level of consciousness: awake and alert and oriented  Post-procedure vital signs: reviewed and stable  Pain management: adequate  Airway patency: patent    PONV status at discharge: No PONV  Anesthetic complications: no      Cardiovascular status: blood pressure returned to baseline and hemodynamically stable  Respiratory status: spontaneous ventilation and room air  Hydration status: euvolemic  Follow-up not needed.            Vitals Value Taken Time   /93 02/24/25 09:57   Temp 97 02/24/25 10:00   Pulse 69 02/24/25 09:59   Resp 19 02/24/25 09:59   SpO2 93 % 02/24/25 09:59   Vitals shown include unfiled device data.      No case tracking events are documented in the log.      Pain/Rashad Score: Rashad Score: 9 (2/24/2025  9:55 AM)

## 2025-02-24 NOTE — H&P
"Subjective      Patient ID: Basim Menendez  is a 69 y.o. male      Chief Complaint:   Chief Complaint  Patient presents with   Referral      Right Inguinal hernia      Vitals  Vitals:    11/19/24 1341  Pulse: (!) 57  SpO2: 98%  Weight: 75.8 kg (167 lb)  Height: 5' 10" (1.778 m)          HPI     Referral     Additional comments: Right Inguinal hernia            Last edited by Nilton Dias MA on 11/19/2024  1:44 PM.      Very pleasant 69-year-old male referred in for evaluation of right inguinal hernia.  No previous abdominal surgery.  Causing minimal discomfort.  Easily reducible.  No change in bowel or bladder habits.  Past Medical History:  Diagnosis Date   Coronary artery disease     Hyperlipidemia     Hypertension     Pollen allergies 2016     Past Surgical History:  Procedure Laterality Date   ANKLE FRACTURE SURGERY       BICEPS TENDON REPAIR       CARDIAC CATHETERIZATION       CORONARY ANGIOPLASTY       KNEE ARTHROSCOPY W/ DEBRIDEMENT       SHOULDER ARTHROSCOPY        left   SINUS SURGERY        deivated septum.  Chronic infection follow injury   SKIN CANCER EXCISION   2020    x 3     Family History  Problem Relation Name Age of Onset   Stroke Mother       Diabetes Father       Heart disease Father       Heart disease Brother            rhythm problems     Past Surgical History:  Procedure Laterality Date   ANKLE FRACTURE SURGERY       BICEPS TENDON REPAIR       CARDIAC CATHETERIZATION       CORONARY ANGIOPLASTY       KNEE ARTHROSCOPY W/ DEBRIDEMENT       SHOULDER ARTHROSCOPY        left   SINUS SURGERY        deivated septum.  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  Son very active in hockey.        Current Medications     Current Outpatient Medications:     atorvastatin (LIPITOR) 40 MG tablet, Take 1 tablet (40 mg total) by mouth every evening., Disp: 90 tablet, Rfl: 0    benzonatate (TESSALON) 200 MG capsule, Take 1 capsule (200 mg total) by mouth 3 (three) times daily as needed for Cough., Disp: 30 capsule, Rfl: 0    carvediloL (COREG) 6.25 MG tablet, Take 1 tablet (6.25 mg total) by mouth 2 (two) times daily., Disp: 180 tablet, Rfl: 3    clopidogreL (PLAVIX) 75 mg tablet, Take 1 tablet (75 mg total) by mouth once daily., Disp: 90 tablet, Rfl: 0    ramipriL (ALTACE) 10 MG capsule, Take 1 capsule (10 mg total) by mouth once daily. Take one by mouth daily for 90 days, Disp: 90 capsule, Rfl: 3    sildenafiL (VIAGRA) 100 MG tablet, Take 1 tablet (100 mg total) by mouth daily as needed for Erectile Dysfunction., Disp: 10 tablet, Rfl: 11     Review of patient's allergies indicates:  No Known Allergies            Review of Systems   Gastrointestinal:         Right groin bulge greater than 2 months.  No left groin bulge.  Easily reducible.  Causing minimal discomfort          I have reviewed the following:       Details / Date      Labs        Micro        Pathology        Imaging        Cardiology Procedures        Other Referral note reviewed        Objective            Physical Exam  Vitals and nursing note reviewed.   Constitutional:       Appearance: Normal appearance. He is normal weight.   HENT:      Head: Normocephalic and atraumatic.      Nose: Nose normal.      Mouth/Throat:      Mouth: Mucous membranes are moist.   Eyes:       Extraocular Movements: Extraocular movements intact.      Pupils: Pupils are equal, round, and reactive to light.   Cardiovascular:      Rate and Rhythm: Normal rate.   Pulmonary:      Effort: Pulmonary effort is normal.   Abdominal:      General: Abdomen is flat.      Palpations: Abdomen is soft.      Hernia: A hernia is present. Hernia is present in the right inguinal area.           Comments: Easily reducible right inguinal hernia   Musculoskeletal:         General: Normal range of motion.      Cervical back: Normal range of motion.   Skin:     General: Skin is warm.   Neurological:      General: No focal deficit present.      Mental Status: He is alert and oriented to person, place, and time. Mental status is at baseline.   Psychiatric:         Mood and Affect: Mood normal.         Behavior: Behavior normal.            Assessment and Plan   69-year-old gentleman with a right inguinal hernia recommend laparoscopic right inguinal hernia.  Risks and benefits have been thoroughly explained to the patient he states he understands and wishes to proceed  1. Right inguinal hernia  -     Vital signs; Standing  -     Insert peripheral IV; Standing  -     Diet NPO; Standing  -     Full code; Standing  -     Case Request Operating Room: REPAIR, HERNIA, INGUINAL, LAPAROSCOPIC  -     Place in Outpatient; Standing  -     Place NITIN hose; Standing     2. Direct inguinal hernia  -     Ambulatory referral/consult to General Surgery     Other orders  -     0.9%  NaCl infusion  -     IP VTE LOW RISK PATIENT; Standing  -     ceFAZolin 2 g in D5W 50 mL IVPB (MB+)        Orders Placed This Encounter  Procedures   Insert peripheral IV        An After Visit Summary was printed and given to the patient.         Kash Wallace MD  [facility] 2nd Floor General Surgery  [address] [place] [address],[place] [id number]  [phone]

## 2025-02-24 NOTE — ANESTHESIA PROCEDURE NOTES
Intubation    Date/Time: 2/24/2025 8:33 AM    Performed by: Dionne Peck CRNA  Authorized by: Marty Woodall MD    Intubation:     Induction:  Intravenous    Intubated:  Postinduction    Mask Ventilation:  Easy mask    Attempts:  1    Attempted By:  JUAN FRANCISCO    Blade:  Tian 3    Laryngeal View Grade: Grade I - full view of cords      Difficult Airway Encountered?: No      Complications:  None    Airway Device:  Oral endotracheal tube    Airway Device Size:  7.5    Style/Cuff Inflation:  Cuffed (inflated to minimal occlusive pressure)    Tube secured:  23    Secured at:  The lips    Placement Verified By:  Capnometry    Complicating Factors:  None    Findings Post-Intubation:  BS equal bilateral and atraumatic/condition of teeth unchanged

## 2025-02-24 NOTE — PLAN OF CARE
Dr. Wallace to bedside speaking with patient regarding surgical outcome and discharge plan of care including the need for the catheter at home. All questions answered.

## 2025-02-25 ENCOUNTER — PATIENT MESSAGE (OUTPATIENT)
Dept: ADMINISTRATIVE | Facility: OTHER | Age: 70
End: 2025-02-25
Payer: MEDICARE

## 2025-02-27 VITALS
RESPIRATION RATE: 13 BRPM | WEIGHT: 167 LBS | HEIGHT: 70 IN | TEMPERATURE: 98 F | SYSTOLIC BLOOD PRESSURE: 156 MMHG | HEART RATE: 72 BPM | BODY MASS INDEX: 23.91 KG/M2 | DIASTOLIC BLOOD PRESSURE: 78 MMHG | OXYGEN SATURATION: 97 %

## 2025-02-28 ENCOUNTER — PATIENT MESSAGE (OUTPATIENT)
Dept: FAMILY MEDICINE | Facility: CLINIC | Age: 70
End: 2025-02-28
Payer: MEDICARE

## 2025-03-06 ENCOUNTER — OFFICE VISIT (OUTPATIENT)
Dept: SURGERY | Facility: CLINIC | Age: 70
End: 2025-03-06
Payer: MEDICARE

## 2025-03-06 VITALS
WEIGHT: 163 LBS | OXYGEN SATURATION: 97 % | BODY MASS INDEX: 23.34 KG/M2 | HEART RATE: 53 BPM | SYSTOLIC BLOOD PRESSURE: 169 MMHG | HEIGHT: 70 IN | DIASTOLIC BLOOD PRESSURE: 79 MMHG

## 2025-03-06 DIAGNOSIS — K40.90 DIRECT INGUINAL HERNIA: Primary | ICD-10-CM

## 2025-03-06 PROCEDURE — 99999 PR PBB SHADOW E&M-EST. PATIENT-LVL III: CPT | Mod: PBBFAC,,, | Performed by: SPECIALIST

## 2025-03-06 PROCEDURE — 99213 OFFICE O/P EST LOW 20 MIN: CPT | Mod: PBBFAC | Performed by: SPECIALIST

## 2025-03-06 PROCEDURE — 99024 POSTOP FOLLOW-UP VISIT: CPT | Mod: POP,,, | Performed by: SPECIALIST

## 2025-03-06 NOTE — PROGRESS NOTES
"Patient is a 70 y.o. male who presents for postoperative evaluation following  and repair    Chief Complaint   Patient presents with    Post-op Evaluation        Vitals:    03/06/25 1025   BP: (!) 169/79   BP Location: Left forearm   Patient Position: Sitting   Pulse: (!) 53   SpO2: 97%   Weight: 73.9 kg (163 lb)   Height: 5' 10" (1.778 m)        Subjective      Physical Exam     Incision clean dry and intact    Drains none    Pathology none    Assessment & Plan     Doing well status post inguinal hernia repair laparoscopic technique  Recommendations refrain from heavy lifting for another couple of weeks    No orders of the defined types were placed in this encounter.       Follow-up p.[email or web address].    Kash Wallace MD  General Surgery  [address]   [place] [address]      Patient instructed that best way to communicate with my office staff is for patient to get on the [facility] epic patient portal to expedite communication and communication issues that may occur.  Patient was given instructions on how to get on the portal.  I encouraged patient to obtain portal access as well.  Ultimately it is up to the patient to obtain access.  Patient voiced understanding.    "

## 2025-03-07 ENCOUNTER — OFFICE VISIT (OUTPATIENT)
Dept: FAMILY MEDICINE | Facility: CLINIC | Age: 70
End: 2025-03-07
Payer: MEDICARE

## 2025-03-07 VITALS
OXYGEN SATURATION: 59 % | HEIGHT: 70 IN | SYSTOLIC BLOOD PRESSURE: 168 MMHG | HEART RATE: 59 BPM | DIASTOLIC BLOOD PRESSURE: 82 MMHG | BODY MASS INDEX: 23.6 KG/M2 | WEIGHT: 164.81 LBS

## 2025-03-07 DIAGNOSIS — I10 ESSENTIAL HYPERTENSION, BENIGN: Primary | ICD-10-CM

## 2025-03-07 RX ORDER — CLONIDINE HYDROCHLORIDE 0.1 MG/1
0.1 TABLET ORAL
Qty: 90 TABLET | Refills: 0 | Status: SHIPPED | OUTPATIENT
Start: 2025-03-07 | End: 2025-04-06

## 2025-03-07 NOTE — PROGRESS NOTES
Subjective:       Patient ID: Basim Menendez is a 70 y.o. male.    Chief Complaint: Hypertension (Patient is here for elevated blood pressure. )      History of Present Illness    CHIEF COMPLAINT:  Basim presents for follow-up on blood pressure management and recent hernia surgery.    HPI:  Basim reports elevated blood pressure recently, with readings in the 140s over the past 4 days. His blood pressure was measured at 148/76 in the office, and later at 168/82 on the right arm. He reports feeling well overall. He recently underwent hernia surgery and states that his recovery is progressing well without complications. The surgical site shows minimal bruising. The surgeon has not yet cleared him for pickleball or bike riding, but has permitted light activities. He has been practicing golf swings over the past 2 days and reports no discomfort. He is currently taking Amlodipine 10 mg twice daily and Carvedilol for blood pressure management.    MEDICATIONS:  Basim is on Amlodipine 10 mg twice daily and Carvedilol.    MEDICAL HISTORY:  Basim has a history of hypertension.    SURGICAL HISTORY:  He recently underwent hernia surgery.         Review of Systems   Constitutional:  Negative for activity change, appetite change and fever.   Respiratory: Negative.     Cardiovascular: Negative.    Gastrointestinal: Negative.    Musculoskeletal: Negative.          Objective:      Physical Exam  Vitals and nursing note reviewed.   Constitutional:       Appearance: Normal appearance.   Cardiovascular:      Rate and Rhythm: Normal rate and regular rhythm.      Pulses: Normal pulses.      Heart sounds: Normal heart sounds. No murmur heard.     No friction rub. No gallop.   Pulmonary:      Effort: Pulmonary effort is normal.      Breath sounds: Normal breath sounds. No wheezing.   Skin:     General: Skin is warm and dry.   Neurological:      Mental Status: He is alert.   Psychiatric:         Mood and Affect: Mood normal.          Assessment:       1. Essential hypertension, benign  Overview:  Elevated today    Assessment & Plan:  Add clonidine prn  Recheck in 2M      Other orders  -     cloNIDine (CATAPRES) 0.1 MG tablet; Take 1 tablet (0.1 mg total) by mouth three times daily with food.  Dispense: 90 tablet; Refill: 0           Plan:       Assessment & Plan    IMPRESSION:  - Blood pressure elevated, running in 140s-160s  - Currently on maximum dose of Tremlodipine  - Unable to increase Carvedilol due to low heart rate (59 bpm)  - Considering addition of diuretic if BP remains elevated at next visit  - Recent hernia surgery by Dr. Hewitt, recovery progressing well    HYPERTENSION:  - Monitored the patient's blood pressure at home for 4 days, with readings around 142.  - Current in-office readings show 148/176 and 168/82 on the right arm.  - Evaluated that the blood pressure has been running high recently, with a heart rate of 59.  - Assessed that the elevated blood pressure might be temporary due to recent surgery and lack of exercise.  - Explained the function of diuretics in lowering blood pressure through sodium excretion.  - Continued Tremlodipine 10 mg twice daily at maximum dose.  - Continued Carvedilol at current dose.  - Plan to recheck blood pressure at the next visit.  - Consider adding a small dose of diuretic if blood pressure remains elevated at next visit.    POST-OPERATIVE CARE:  - Basim to continue easy workup as advised by surgeon (e.g., hitting golf balls).  - Basim to avoid biking and pickleball as per surgeon's instructions.    FOLLOW UP:  - Instructed the patient to follow up after trip to reassess blood pressure.       Basim was seen today for hypertension.    Diagnoses and all orders for this visit:    Essential hypertension, benign    Other orders  -     cloNIDine (CATAPRES) 0.1 MG tablet; Take 1 tablet (0.1 mg total) by mouth three times daily with food.            I spent a total of 20 minutes on the day of the  visit.This includes face to face time and non-face to face time preparing to see the patient (eg, review of tests), obtaining and/or reviewing separately obtained history, documenting clinical information in the electronic or other health record, independently interpreting results and communicating results to the patient/family/caregiver, or care coordinator.

## 2025-06-16 DIAGNOSIS — I25.118 CORONARY ARTERY DISEASE OF NATIVE ARTERY OF NATIVE HEART WITH STABLE ANGINA PECTORIS: ICD-10-CM

## 2025-06-16 DIAGNOSIS — I10 ESSENTIAL HYPERTENSION: Chronic | ICD-10-CM

## 2025-06-16 DIAGNOSIS — I20.9 ANGINA PECTORIS: ICD-10-CM

## 2025-06-16 DIAGNOSIS — Z95.5 S/P DRUG ELUTING CORONARY STENT PLACEMENT: ICD-10-CM

## 2025-06-16 RX ORDER — RAMIPRIL 10 MG/1
10 CAPSULE ORAL DAILY
Qty: 90 CAPSULE | Refills: 1 | Status: SHIPPED | OUTPATIENT
Start: 2025-06-16 | End: 2025-12-13

## 2025-06-16 RX ORDER — CLOPIDOGREL BISULFATE 75 MG/1
75 TABLET ORAL DAILY
Qty: 90 TABLET | Refills: 1 | Status: SHIPPED | OUTPATIENT
Start: 2025-06-16 | End: 2025-12-13

## 2025-06-16 RX ORDER — CARVEDILOL 6.25 MG/1
6.25 TABLET ORAL 2 TIMES DAILY
Qty: 180 TABLET | Refills: 1 | Status: SHIPPED | OUTPATIENT
Start: 2025-06-16 | End: 2025-12-13

## 2025-06-16 NOTE — TELEPHONE ENCOUNTER
Tod Denney,  Please review this Rx refill for this patient of Dr. Wang.  Please review.  Type: Needs Medical Advice  [facility]     Best Call Back Number:     [facility] [address] [place], [place] - [address]  [address] [address]  Phone: [phone] Fax: [phone]      Additional Information: requesting call back in regards to needing an update on pt refill please advise

## 2025-07-01 ENCOUNTER — TELEPHONE (OUTPATIENT)
Dept: FAMILY MEDICINE | Facility: CLINIC | Age: 70
End: 2025-07-01
Payer: MEDICARE

## 2025-07-01 ENCOUNTER — PATIENT MESSAGE (OUTPATIENT)
Dept: FAMILY MEDICINE | Facility: CLINIC | Age: 70
End: 2025-07-01
Payer: MEDICARE

## 2025-07-01 NOTE — TELEPHONE ENCOUNTER
Called to reschedule patient's upcoming appointment, as Dr. Wang is no longer with our practice. There was no answer, so a voicemail was left requesting a call back to reschedule with another provider.

## (undated) DEVICE — ENDO GRASPER ETHICON 5DSG

## (undated) DEVICE — PENCIL SMK EVAC CONNECTOR 10FT

## (undated) DEVICE — CATH FOL SIL PED 2W 10FR 3ML

## (undated) DEVICE — CATH COUDE 18F 5CC W/STAT LOC

## (undated) DEVICE — SUT VICRYL+ 27 UR-6 VIOL

## (undated) DEVICE — TROCAR ENDOPATH XCEL 11MM 10CM

## (undated) DEVICE — TRAY SKIN SCRUB WET 4 COMPART

## (undated) DEVICE — DEVICE FIXATION ABSTACK 30

## (undated) DEVICE — TUBING INSUFFLATOR W/FILTR 10

## (undated) DEVICE — SCISSOR CURVED ENDOPATH 5MM

## (undated) DEVICE — GLOVE SENSICARE PI GRN 7

## (undated) DEVICE — BAG DRAIN ANTI REFLUX 2000ML

## (undated) DEVICE — BLLN SPACEMAKER

## (undated) DEVICE — BINDER ABDOM 4PANEL 12IN LG/XL

## (undated) DEVICE — PAD CURAD NONADH 3X4IN

## (undated) DEVICE — CATH FOLEY SIL 2WAY 12FR 5CC

## (undated) DEVICE — Device

## (undated) DEVICE — JELLY SURGILUBE LUBE TUBE 2OZ

## (undated) DEVICE — SOL CLEARIFY VISUALIZATION LAP

## (undated) DEVICE — GLOVE SENSICARE PI SURG 7

## (undated) DEVICE — SUT MCRYL PLUS 4-0 PS2 27IN

## (undated) DEVICE — ADHESIVE DERMABOND ADVANCED

## (undated) DEVICE — GLOVE BIOGEL ECLIPSE SZ 7.5

## (undated) DEVICE — DRESSING TRANS 2X2 TEGADERM

## (undated) DEVICE — ELECTRODE REM PLYHSV RETURN 9

## (undated) DEVICE — TROCAR BLDELSS 5X100 STABILITY

## (undated) DEVICE — TROCAR SPACEMAKER BLUNT 12MM

## (undated) DEVICE — DRAPE ABDOMINAL TIBURON 14X11